# Patient Record
Sex: FEMALE | Race: BLACK OR AFRICAN AMERICAN | NOT HISPANIC OR LATINO
[De-identification: names, ages, dates, MRNs, and addresses within clinical notes are randomized per-mention and may not be internally consistent; named-entity substitution may affect disease eponyms.]

---

## 2020-03-13 ENCOUNTER — APPOINTMENT (OUTPATIENT)
Dept: OBGYN | Facility: CLINIC | Age: 25
End: 2020-03-13
Payer: MEDICAID

## 2020-03-13 ENCOUNTER — ASOB RESULT (OUTPATIENT)
Age: 25
End: 2020-03-13

## 2020-03-13 ENCOUNTER — NON-APPOINTMENT (OUTPATIENT)
Age: 25
End: 2020-03-13

## 2020-03-13 VITALS
WEIGHT: 148 LBS | SYSTOLIC BLOOD PRESSURE: 116 MMHG | DIASTOLIC BLOOD PRESSURE: 60 MMHG | HEIGHT: 56 IN | BODY MASS INDEX: 33.29 KG/M2

## 2020-03-13 DIAGNOSIS — Z78.9 OTHER SPECIFIED HEALTH STATUS: ICD-10-CM

## 2020-03-13 DIAGNOSIS — Z82.49 FAMILY HISTORY OF ISCHEMIC HEART DISEASE AND OTHER DISEASES OF THE CIRCULATORY SYSTEM: ICD-10-CM

## 2020-03-13 PROCEDURE — 0500F INITIAL PRENATAL CARE VISIT: CPT

## 2020-03-13 PROCEDURE — 76805 OB US >/= 14 WKS SNGL FETUS: CPT

## 2020-03-13 PROCEDURE — 36415 COLL VENOUS BLD VENIPUNCTURE: CPT

## 2020-03-20 ENCOUNTER — APPOINTMENT (OUTPATIENT)
Dept: ANTEPARTUM | Facility: CLINIC | Age: 25
End: 2020-03-20
Payer: MEDICAID

## 2020-03-20 ENCOUNTER — ASOB RESULT (OUTPATIENT)
Age: 25
End: 2020-03-20

## 2020-03-20 PROCEDURE — 76817 TRANSVAGINAL US OBSTETRIC: CPT

## 2020-03-20 PROCEDURE — 76816 OB US FOLLOW-UP PER FETUS: CPT

## 2020-03-24 ENCOUNTER — NON-APPOINTMENT (OUTPATIENT)
Age: 25
End: 2020-03-24

## 2020-03-24 LAB
ABO + RH PNL BLD: NORMAL
AR GENE MUT ANL BLD/T: NEGATIVE
B19V IGG SER QL IA: 2.2 INDEX
B19V IGG+IGM SER-IMP: NORMAL
B19V IGG+IGM SER-IMP: POSITIVE
B19V IGM FLD-ACNC: 0.1
B19V IGM SER-ACNC: NEGATIVE
BASOPHILS # BLD AUTO: 0.05 K/UL
BASOPHILS NFR BLD AUTO: 0.5 %
BLD GP AB SCN SERPL QL: NORMAL
C TRACH RRNA SPEC QL NAA+PROBE: NOT DETECTED
CFTR MUT TESTED BLD/T: NEGATIVE
CMV IGG SERPL QL: 0.37 U/ML
CMV IGG SERPL-IMP: NEGATIVE
CMV IGM SERPL QL: <8 AU/ML
CMV IGM SERPL QL: NEGATIVE
CYTOLOGY CVX/VAG DOC THIN PREP: NORMAL
EOSINOPHIL # BLD AUTO: 0.09 K/UL
EOSINOPHIL NFR BLD AUTO: 0.9 %
ESTIMATED AVERAGE GLUCOSE: 97 MG/DL
FMR1 GENE MUT ANL BLD/T: NORMAL
HBA1C MFR BLD HPLC: 5 %
HBV SURFACE AG SER QL: NONREACTIVE
HCT VFR BLD CALC: 31.5 %
HGB A MFR BLD: 96.9 %
HGB A2 MFR BLD: 3.1 %
HGB BLD-MCNC: 10.5 G/DL
HGB FRACT BLD-IMP: NORMAL
HIV1+2 AB SPEC QL IA.RAPID: NONREACTIVE
HPV HIGH+LOW RISK DNA PNL CVX: NOT DETECTED
IMM GRANULOCYTES NFR BLD AUTO: 0.5 %
LYMPHOCYTES # BLD AUTO: 2.16 K/UL
LYMPHOCYTES NFR BLD AUTO: 20.6 %
MAN DIFF?: NORMAL
MCHC RBC-ENTMCNC: 31.2 PG
MCHC RBC-ENTMCNC: 33.3 GM/DL
MCV RBC AUTO: 93.5 FL
MEV IGG FLD QL IA: 35.6 AU/ML
MEV IGG+IGM SER-IMP: POSITIVE
MEV IGM SER QL: NEGATIVE
MONOCYTES # BLD AUTO: 0.61 K/UL
MONOCYTES NFR BLD AUTO: 5.8 %
N GONORRHOEA RRNA SPEC QL NAA+PROBE: NOT DETECTED
NEUTROPHILS # BLD AUTO: 7.53 K/UL
NEUTROPHILS NFR BLD AUTO: 71.7 %
PLATELET # BLD AUTO: 368 K/UL
RBC # BLD: 3.37 M/UL
RBC # FLD: 14 %
RUBV IGG FLD-ACNC: 1.1 INDEX
RUBV IGG SER-IMP: POSITIVE
SOURCE TP AMPLIFICATION: NORMAL
T GONDII AB SER-IMP: NEGATIVE
T GONDII AB SER-IMP: NEGATIVE
T GONDII IGG SER QL: <3 IU/ML
T GONDII IGM SER QL: 4 AU/ML
T PALLIDUM AB SER QL IA: NEGATIVE
TSH SERPL-ACNC: 1.9 UIU/ML
VZV AB TITR SER: POSITIVE
VZV IGG SER IF-ACNC: 344.2 INDEX
WBC # FLD AUTO: 10.49 K/UL

## 2020-03-27 ENCOUNTER — ASOB RESULT (OUTPATIENT)
Age: 25
End: 2020-03-27

## 2020-03-27 ENCOUNTER — NON-APPOINTMENT (OUTPATIENT)
Age: 25
End: 2020-03-27

## 2020-03-27 ENCOUNTER — APPOINTMENT (OUTPATIENT)
Dept: ANTEPARTUM | Facility: CLINIC | Age: 25
End: 2020-03-27
Payer: MEDICAID

## 2020-03-27 ENCOUNTER — APPOINTMENT (OUTPATIENT)
Dept: OBGYN | Facility: CLINIC | Age: 25
End: 2020-03-27
Payer: MEDICAID

## 2020-03-27 VITALS
SYSTOLIC BLOOD PRESSURE: 106 MMHG | BODY MASS INDEX: 32.84 KG/M2 | DIASTOLIC BLOOD PRESSURE: 62 MMHG | WEIGHT: 146 LBS | HEIGHT: 56 IN

## 2020-03-27 VITALS — TEMPERATURE: 98.9 F

## 2020-03-27 LAB
BILIRUB UR QL STRIP: NORMAL
GLUCOSE UR-MCNC: NORMAL
HCG UR QL: 0.2 EU/DL
HGB UR QL STRIP.AUTO: ABNORMAL
KETONES UR-MCNC: NORMAL
LEUKOCYTE ESTERASE UR QL STRIP: ABNORMAL
NITRITE UR QL STRIP: NORMAL
PH UR STRIP: 6
PROT UR STRIP-MCNC: ABNORMAL
SP GR UR STRIP: >=1.03

## 2020-03-27 PROCEDURE — 36415 COLL VENOUS BLD VENIPUNCTURE: CPT

## 2020-03-27 PROCEDURE — 76816 OB US FOLLOW-UP PER FETUS: CPT

## 2020-03-27 PROCEDURE — 0502F SUBSEQUENT PRENATAL CARE: CPT

## 2020-04-09 ENCOUNTER — NON-APPOINTMENT (OUTPATIENT)
Age: 25
End: 2020-04-09

## 2020-04-20 LAB
BASOPHILS # BLD AUTO: 0.02 K/UL
BASOPHILS NFR BLD AUTO: 0.3 %
EOSINOPHIL # BLD AUTO: 0.07 K/UL
EOSINOPHIL NFR BLD AUTO: 1 %
FERRITIN SERPL-MCNC: 17 NG/ML
FOLATE SERPL-MCNC: >20 NG/ML
GLUCOSE 1H P 50 G GLC PO SERPL-MCNC: 123 MG/DL
HCT VFR BLD CALC: 29.2 %
HGB BLD-MCNC: 9.5 G/DL
IMM GRANULOCYTES NFR BLD AUTO: 0.4 %
IRON SATN MFR SERPL: 26 %
IRON SERPL-MCNC: 97 UG/DL
LYMPHOCYTES # BLD AUTO: 1.56 K/UL
LYMPHOCYTES NFR BLD AUTO: 22.7 %
MAN DIFF?: NORMAL
MCHC RBC-ENTMCNC: 30 PG
MCHC RBC-ENTMCNC: 32.5 GM/DL
MCV RBC AUTO: 92.1 FL
MONOCYTES # BLD AUTO: 0.45 K/UL
MONOCYTES NFR BLD AUTO: 6.6 %
NEUTROPHILS # BLD AUTO: 4.73 K/UL
NEUTROPHILS NFR BLD AUTO: 69 %
PLATELET # BLD AUTO: 327 K/UL
RBC # BLD: 3.17 M/UL
RBC # BLD: 3.17 M/UL
RBC # FLD: 13.5 %
RETICS # AUTO: 2.3 %
RETICS AGGREG/RBC NFR: 73.5 K/UL
TIBC SERPL-MCNC: 366 UG/DL
UIBC SERPL-MCNC: 269 UG/DL
VIT B12 SERPL-MCNC: 343 PG/ML
WBC # FLD AUTO: 6.86 K/UL

## 2020-04-24 ENCOUNTER — APPOINTMENT (OUTPATIENT)
Dept: OBGYN | Facility: CLINIC | Age: 25
End: 2020-04-24
Payer: MEDICAID

## 2020-04-24 ENCOUNTER — NON-APPOINTMENT (OUTPATIENT)
Age: 25
End: 2020-04-24

## 2020-04-24 VITALS
SYSTOLIC BLOOD PRESSURE: 80 MMHG | BODY MASS INDEX: 33.61 KG/M2 | DIASTOLIC BLOOD PRESSURE: 58 MMHG | HEIGHT: 56 IN | WEIGHT: 149.4 LBS

## 2020-04-24 LAB
BILIRUB UR QL STRIP: NORMAL
COLLECTION METHOD: NORMAL
GLUCOSE UR-MCNC: NORMAL
HCG UR QL: 0.2 EU/DL
HGB UR QL STRIP.AUTO: NORMAL
KETONES UR-MCNC: NORMAL
LEUKOCYTE ESTERASE UR QL STRIP: NORMAL
NITRITE UR QL STRIP: NORMAL
PH UR STRIP: 6.5
PROT UR STRIP-MCNC: NORMAL
SP GR UR STRIP: 1.01

## 2020-04-24 PROCEDURE — 36415 COLL VENOUS BLD VENIPUNCTURE: CPT

## 2020-04-24 PROCEDURE — 0502F SUBSEQUENT PRENATAL CARE: CPT

## 2020-04-28 LAB
M TB IFN-G BLD-IMP: NEGATIVE
QUANTIFERON TB PLUS MITOGEN MINUS NIL: 6.13 IU/ML
QUANTIFERON TB PLUS NIL: 0.01 IU/ML
QUANTIFERON TB PLUS TB1 MINUS NIL: 0 IU/ML
QUANTIFERON TB PLUS TB2 MINUS NIL: 0 IU/ML

## 2020-05-08 ENCOUNTER — TRANSCRIPTION ENCOUNTER (OUTPATIENT)
Age: 25
End: 2020-05-08

## 2020-05-18 ENCOUNTER — APPOINTMENT (OUTPATIENT)
Dept: ANTEPARTUM | Facility: CLINIC | Age: 25
End: 2020-05-18

## 2020-05-19 ENCOUNTER — APPOINTMENT (OUTPATIENT)
Dept: ANTEPARTUM | Facility: CLINIC | Age: 25
End: 2020-05-19

## 2020-05-20 ENCOUNTER — APPOINTMENT (OUTPATIENT)
Dept: OBGYN | Facility: CLINIC | Age: 25
End: 2020-05-20
Payer: MEDICAID

## 2020-05-20 ENCOUNTER — NON-APPOINTMENT (OUTPATIENT)
Age: 25
End: 2020-05-20

## 2020-05-20 VITALS — HEIGHT: 56 IN | BODY MASS INDEX: 33.29 KG/M2 | WEIGHT: 148 LBS

## 2020-05-20 LAB
BILIRUB UR QL STRIP: NORMAL
GLUCOSE UR-MCNC: NORMAL
HCG UR QL: 0.2 EU/DL
HGB UR QL STRIP.AUTO: ABNORMAL
KETONES UR-MCNC: NORMAL
LEUKOCYTE ESTERASE UR QL STRIP: ABNORMAL
NITRITE UR QL STRIP: NORMAL
PH UR STRIP: 5.5
PROT UR STRIP-MCNC: NORMAL
SP GR UR STRIP: 1.01

## 2020-05-20 PROCEDURE — 90471 IMMUNIZATION ADMIN: CPT

## 2020-05-20 PROCEDURE — 90715 TDAP VACCINE 7 YRS/> IM: CPT

## 2020-05-20 PROCEDURE — 0502F SUBSEQUENT PRENATAL CARE: CPT

## 2020-06-10 ENCOUNTER — NON-APPOINTMENT (OUTPATIENT)
Age: 25
End: 2020-06-10

## 2020-06-10 ENCOUNTER — APPOINTMENT (OUTPATIENT)
Dept: OBGYN | Facility: CLINIC | Age: 25
End: 2020-06-10
Payer: MEDICAID

## 2020-06-10 VITALS
BODY MASS INDEX: 34.42 KG/M2 | WEIGHT: 153 LBS | SYSTOLIC BLOOD PRESSURE: 112 MMHG | DIASTOLIC BLOOD PRESSURE: 66 MMHG | HEIGHT: 56 IN

## 2020-06-10 PROCEDURE — 36415 COLL VENOUS BLD VENIPUNCTURE: CPT

## 2020-06-10 PROCEDURE — 0502F SUBSEQUENT PRENATAL CARE: CPT

## 2020-06-18 ENCOUNTER — APPOINTMENT (OUTPATIENT)
Dept: OBGYN | Facility: CLINIC | Age: 25
End: 2020-06-18
Payer: MEDICAID

## 2020-06-18 ENCOUNTER — NON-APPOINTMENT (OUTPATIENT)
Age: 25
End: 2020-06-18

## 2020-06-18 VITALS
SYSTOLIC BLOOD PRESSURE: 108 MMHG | DIASTOLIC BLOOD PRESSURE: 62 MMHG | WEIGHT: 154 LBS | BODY MASS INDEX: 34.64 KG/M2 | HEIGHT: 56 IN

## 2020-06-18 PROCEDURE — 0502F SUBSEQUENT PRENATAL CARE: CPT

## 2020-06-19 ENCOUNTER — APPOINTMENT (OUTPATIENT)
Dept: OBGYN | Facility: CLINIC | Age: 25
End: 2020-06-19

## 2020-06-22 LAB
B-HEM STREP SPEC QL CULT: NORMAL
BILIRUB UR QL STRIP: NORMAL
COLLECTION METHOD: NORMAL
GLUCOSE UR-MCNC: NORMAL
HCG UR QL: 0.2 EU/DL
HGB UR QL STRIP.AUTO: NORMAL
HIV1+2 AB SPEC QL IA.RAPID: NONREACTIVE
KETONES UR-MCNC: NORMAL
LEUKOCYTE ESTERASE UR QL STRIP: ABNORMAL
NITRITE UR QL STRIP: NORMAL
PH UR STRIP: 7
PROT UR STRIP-MCNC: NORMAL
SP GR UR STRIP: 1.01

## 2020-06-25 ENCOUNTER — NON-APPOINTMENT (OUTPATIENT)
Age: 25
End: 2020-06-25

## 2020-06-25 ENCOUNTER — APPOINTMENT (OUTPATIENT)
Dept: OBGYN | Facility: CLINIC | Age: 25
End: 2020-06-25
Payer: MEDICAID

## 2020-06-25 VITALS
WEIGHT: 155 LBS | BODY MASS INDEX: 34.87 KG/M2 | DIASTOLIC BLOOD PRESSURE: 70 MMHG | SYSTOLIC BLOOD PRESSURE: 108 MMHG | HEIGHT: 56 IN

## 2020-06-25 LAB
BILIRUB UR QL STRIP: NORMAL
GLUCOSE UR-MCNC: NORMAL
HCG UR QL: 0.2 EU/DL
HGB UR QL STRIP.AUTO: NORMAL
KETONES UR-MCNC: NORMAL
LEUKOCYTE ESTERASE UR QL STRIP: ABNORMAL
NITRITE UR QL STRIP: NORMAL
PH UR STRIP: 7
PROT UR STRIP-MCNC: NORMAL
SP GR UR STRIP: 1.02

## 2020-06-25 PROCEDURE — 0502F SUBSEQUENT PRENATAL CARE: CPT

## 2020-06-28 ENCOUNTER — OUTPATIENT (OUTPATIENT)
Dept: OUTPATIENT SERVICES | Facility: HOSPITAL | Age: 25
LOS: 1 days | End: 2020-06-28
Payer: MEDICAID

## 2020-06-28 ENCOUNTER — OUTPATIENT (OUTPATIENT)
Dept: OUTPATIENT SERVICES | Facility: HOSPITAL | Age: 25
LOS: 1 days | End: 2020-06-28

## 2020-06-28 VITALS
WEIGHT: 154.1 LBS | HEIGHT: 66 IN | HEART RATE: 73 BPM | SYSTOLIC BLOOD PRESSURE: 102 MMHG | DIASTOLIC BLOOD PRESSURE: 56 MMHG | TEMPERATURE: 98 F | RESPIRATION RATE: 17 BRPM

## 2020-06-28 DIAGNOSIS — Z01.818 ENCOUNTER FOR OTHER PREPROCEDURAL EXAMINATION: ICD-10-CM

## 2020-06-28 DIAGNOSIS — Z98.891 HISTORY OF UTERINE SCAR FROM PREVIOUS SURGERY: Chronic | ICD-10-CM

## 2020-06-28 LAB
ALBUMIN SERPL ELPH-MCNC: 3.5 G/DL — SIGNIFICANT CHANGE UP (ref 3.3–5.2)
ALP SERPL-CCNC: 173 U/L — HIGH (ref 40–120)
ALT FLD-CCNC: 19 U/L — SIGNIFICANT CHANGE UP
ANION GAP SERPL CALC-SCNC: 15 MMOL/L — SIGNIFICANT CHANGE UP (ref 5–17)
APPEARANCE UR: CLEAR — SIGNIFICANT CHANGE UP
APTT BLD: 33.8 SEC — SIGNIFICANT CHANGE UP (ref 27.5–36.3)
AST SERPL-CCNC: 22 U/L — SIGNIFICANT CHANGE UP
BACTERIA # UR AUTO: ABNORMAL
BASOPHILS # BLD AUTO: 0.01 K/UL — SIGNIFICANT CHANGE UP (ref 0–0.2)
BASOPHILS NFR BLD AUTO: 0.1 % — SIGNIFICANT CHANGE UP (ref 0–2)
BILIRUB SERPL-MCNC: 0.3 MG/DL — LOW (ref 0.4–2)
BILIRUB UR-MCNC: NEGATIVE — SIGNIFICANT CHANGE UP
BLD GP AB SCN SERPL QL: SIGNIFICANT CHANGE UP
BUN SERPL-MCNC: 4 MG/DL — LOW (ref 8–20)
CALCIUM SERPL-MCNC: 8.7 MG/DL — SIGNIFICANT CHANGE UP (ref 8.6–10.2)
CHLORIDE SERPL-SCNC: 103 MMOL/L — SIGNIFICANT CHANGE UP (ref 98–107)
CO2 SERPL-SCNC: 18 MMOL/L — LOW (ref 22–29)
COLOR SPEC: YELLOW — SIGNIFICANT CHANGE UP
CREAT SERPL-MCNC: 0.39 MG/DL — LOW (ref 0.5–1.3)
DIFF PNL FLD: ABNORMAL
EOSINOPHIL # BLD AUTO: 0.05 K/UL — SIGNIFICANT CHANGE UP (ref 0–0.5)
EOSINOPHIL NFR BLD AUTO: 0.7 % — SIGNIFICANT CHANGE UP (ref 0–6)
EPI CELLS # UR: SIGNIFICANT CHANGE UP
GLUCOSE SERPL-MCNC: 81 MG/DL — SIGNIFICANT CHANGE UP (ref 70–99)
GLUCOSE UR QL: NEGATIVE MG/DL — SIGNIFICANT CHANGE UP
HCT VFR BLD CALC: 36.5 % — SIGNIFICANT CHANGE UP (ref 34.5–45)
HGB BLD-MCNC: 12.5 G/DL — SIGNIFICANT CHANGE UP (ref 11.5–15.5)
IMM GRANULOCYTES NFR BLD AUTO: 0.6 % — SIGNIFICANT CHANGE UP (ref 0–1.5)
INR BLD: 1.03 RATIO — SIGNIFICANT CHANGE UP (ref 0.88–1.16)
KETONES UR-MCNC: NEGATIVE — SIGNIFICANT CHANGE UP
LEUKOCYTE ESTERASE UR-ACNC: ABNORMAL
LYMPHOCYTES # BLD AUTO: 1.61 K/UL — SIGNIFICANT CHANGE UP (ref 1–3.3)
LYMPHOCYTES # BLD AUTO: 22.8 % — SIGNIFICANT CHANGE UP (ref 13–44)
MCHC RBC-ENTMCNC: 30.6 PG — SIGNIFICANT CHANGE UP (ref 27–34)
MCHC RBC-ENTMCNC: 34.2 GM/DL — SIGNIFICANT CHANGE UP (ref 32–36)
MCV RBC AUTO: 89.2 FL — SIGNIFICANT CHANGE UP (ref 80–100)
MONOCYTES # BLD AUTO: 0.47 K/UL — SIGNIFICANT CHANGE UP (ref 0–0.9)
MONOCYTES NFR BLD AUTO: 6.6 % — SIGNIFICANT CHANGE UP (ref 2–14)
NEUTROPHILS # BLD AUTO: 4.89 K/UL — SIGNIFICANT CHANGE UP (ref 1.8–7.4)
NEUTROPHILS NFR BLD AUTO: 69.2 % — SIGNIFICANT CHANGE UP (ref 43–77)
NITRITE UR-MCNC: NEGATIVE — SIGNIFICANT CHANGE UP
PH UR: 6.5 — SIGNIFICANT CHANGE UP (ref 5–8)
PLATELET # BLD AUTO: 276 K/UL — SIGNIFICANT CHANGE UP (ref 150–400)
POTASSIUM SERPL-MCNC: 3.3 MMOL/L — LOW (ref 3.5–5.3)
POTASSIUM SERPL-SCNC: 3.3 MMOL/L — LOW (ref 3.5–5.3)
PROT SERPL-MCNC: 6.6 G/DL — SIGNIFICANT CHANGE UP (ref 6.6–8.7)
PROT UR-MCNC: 15 MG/DL
PROTHROM AB SERPL-ACNC: 11.7 SEC — SIGNIFICANT CHANGE UP (ref 10–12.9)
RBC # BLD: 4.09 M/UL — SIGNIFICANT CHANGE UP (ref 3.8–5.2)
RBC # FLD: 14.6 % — HIGH (ref 10.3–14.5)
RBC CASTS # UR COMP ASSIST: SIGNIFICANT CHANGE UP /HPF (ref 0–4)
SODIUM SERPL-SCNC: 135 MMOL/L — SIGNIFICANT CHANGE UP (ref 135–145)
SP GR SPEC: 1.01 — SIGNIFICANT CHANGE UP (ref 1.01–1.02)
UROBILINOGEN FLD QL: NEGATIVE MG/DL — SIGNIFICANT CHANGE UP
WBC # BLD: 7.07 K/UL — SIGNIFICANT CHANGE UP (ref 3.8–10.5)
WBC # FLD AUTO: 7.07 K/UL — SIGNIFICANT CHANGE UP (ref 3.8–10.5)
WBC UR QL: SIGNIFICANT CHANGE UP

## 2020-06-29 ENCOUNTER — TRANSCRIPTION ENCOUNTER (OUTPATIENT)
Age: 25
End: 2020-06-29

## 2020-06-29 LAB — SARS-COV-2 RNA SPEC QL NAA+PROBE: SIGNIFICANT CHANGE UP

## 2020-06-29 RX ORDER — OXYTOCIN 10 UNIT/ML
333.33 VIAL (ML) INJECTION
Qty: 20 | Refills: 0 | Status: DISCONTINUED | OUTPATIENT
Start: 2020-06-30 | End: 2020-07-01

## 2020-06-29 RX ORDER — METOCLOPRAMIDE HCL 10 MG
10 TABLET ORAL ONCE
Refills: 0 | Status: DISCONTINUED | OUTPATIENT
Start: 2020-06-30 | End: 2020-06-30

## 2020-06-29 RX ORDER — SODIUM CHLORIDE 9 MG/ML
1000 INJECTION, SOLUTION INTRAVENOUS
Refills: 0 | Status: DISCONTINUED | OUTPATIENT
Start: 2020-06-30 | End: 2020-06-30

## 2020-06-29 NOTE — OB PROVIDER H&P - NSHPPHYSICALEXAM_GEN_ALL_CORE
Vital Signs Last 24 Hrs  T(C): 36.7 (30 Jun 2020 09:59), Max: 36.7 (30 Jun 2020 09:59)  T(F): 98.1 (30 Jun 2020 09:59), Max: 98.1 (30 Jun 2020 09:59)  HR: 86 (30 Jun 2020 09:59) (86 - 86)  BP: 118/71 (30 Jun 2020 09:59) (118/71 - 118/71)  RR: 18 (30 Jun 2020 09:59) (18 - 18)    FHT: Cat 1  [place]: q3-4m, painless

## 2020-06-29 NOTE — OB PROVIDER H&P - ASSESSMENT
25 year old  at 39w3d admitted for repeat . Cat 1 tracing. VSS.    Plan:  Admit to OBGYN  Routine labs  COVID Neg  NST  Ancef 2g  PPH Risk: 2x prbc on hold    d/w Dr. Gandara

## 2020-06-29 NOTE — OB PROVIDER H&P - HISTORY OF PRESENT ILLNESS
Sherine Morin   Patient is a 25 year old  at 39w3d who presents to L&D for repeat  delivery   [place]ALE[place]: 2020   LMP: 2019   Pregnancy course: complicated by late to care at 23w, anemia during pregnancy,   Obhx:   G1: 2014 38w c/s at [facility]   G2: 10/2016 39w c/s at [facility]   Pmhx: none   Pshx: c/s x2,   Meds: pnv, iron   Allergies: nkda   BMI: 33.2   No recent sono Sherine Morin   Patient is a 25 year old  at 39w3d who presents to L&D for repeat  delivery   [place]ALE[place]: 2020   LMP: 2019   Pregnancy course: complicated by late to care at 23w, anemia during pregnancy,   Obhx:   G1: 2014 38w c/s at [facility]   G2: 10/2016 39w c/s at [facility]   Pmhx: 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

## 2020-06-30 ENCOUNTER — APPOINTMENT (OUTPATIENT)
Dept: OBGYN | Facility: HOSPITAL | Age: 25
End: 2020-06-30

## 2020-06-30 ENCOUNTER — RESULT REVIEW (OUTPATIENT)
Age: 25
End: 2020-06-30

## 2020-06-30 ENCOUNTER — TRANSCRIPTION ENCOUNTER (OUTPATIENT)
Age: 25
End: 2020-06-30

## 2020-06-30 ENCOUNTER — INPATIENT (INPATIENT)
Facility: HOSPITAL | Age: 25
LOS: 0 days | Discharge: HOME | End: 2020-07-01
Attending: SPECIALIST | Admitting: SPECIALIST
Payer: MEDICAID

## 2020-06-30 VITALS — SYSTOLIC BLOOD PRESSURE: 118 MMHG | HEART RATE: 86 BPM | DIASTOLIC BLOOD PRESSURE: 71 MMHG

## 2020-06-30 DIAGNOSIS — O34.219 MATERNAL CARE FOR UNSPECIFIED TYPE SCAR FROM PREVIOUS CESAREAN DELIVERY: ICD-10-CM

## 2020-06-30 DIAGNOSIS — Z98.891 HISTORY OF UTERINE SCAR FROM PREVIOUS SURGERY: Chronic | ICD-10-CM

## 2020-06-30 LAB
BLD GP AB SCN SERPL QL: SIGNIFICANT CHANGE UP
T PALLIDUM AB TITR SER: NEGATIVE — SIGNIFICANT CHANGE UP

## 2020-06-30 PROCEDURE — 88304 TISSUE EXAM BY PATHOLOGIST: CPT | Mod: 26

## 2020-06-30 PROCEDURE — 86900 BLOOD TYPING SEROLOGIC ABO: CPT

## 2020-06-30 PROCEDURE — 85027 COMPLETE CBC AUTOMATED: CPT

## 2020-06-30 PROCEDURE — G0463: CPT

## 2020-06-30 PROCEDURE — 86850 RBC ANTIBODY SCREEN: CPT

## 2020-06-30 PROCEDURE — 59510 CESAREAN DELIVERY: CPT | Mod: U9

## 2020-06-30 PROCEDURE — 36415 COLL VENOUS BLD VENIPUNCTURE: CPT

## 2020-06-30 PROCEDURE — 85730 THROMBOPLASTIN TIME PARTIAL: CPT

## 2020-06-30 PROCEDURE — 81001 URINALYSIS AUTO W/SCOPE: CPT

## 2020-06-30 PROCEDURE — 13101 CMPLX RPR TRUNK 2.6-7.5 CM: CPT | Mod: 59

## 2020-06-30 PROCEDURE — 86901 BLOOD TYPING SEROLOGIC RH(D): CPT

## 2020-06-30 PROCEDURE — 80053 COMPREHEN METABOLIC PANEL: CPT

## 2020-06-30 PROCEDURE — U0003: CPT

## 2020-06-30 PROCEDURE — 86923 COMPATIBILITY TEST ELECTRIC: CPT

## 2020-06-30 PROCEDURE — 85610 PROTHROMBIN TIME: CPT

## 2020-06-30 RX ORDER — DIPHENHYDRAMINE HCL 50 MG
25 CAPSULE ORAL EVERY 6 HOURS
Refills: 0 | Status: DISCONTINUED | OUTPATIENT
Start: 2020-06-30 | End: 2020-07-01

## 2020-06-30 RX ORDER — OXYCODONE HYDROCHLORIDE 5 MG/1
5 TABLET ORAL
Refills: 0 | Status: DISCONTINUED | OUTPATIENT
Start: 2020-06-30 | End: 2020-07-01

## 2020-06-30 RX ORDER — CEFAZOLIN SODIUM 1 G
2000 VIAL (EA) INJECTION ONCE
Refills: 0 | Status: COMPLETED | OUTPATIENT
Start: 2020-06-30 | End: 2020-06-30

## 2020-06-30 RX ORDER — SODIUM CHLORIDE 9 MG/ML
1000 INJECTION, SOLUTION INTRAVENOUS ONCE
Refills: 0 | Status: COMPLETED | OUTPATIENT
Start: 2020-06-30 | End: 2020-06-30

## 2020-06-30 RX ORDER — SIMETHICONE 80 MG/1
80 TABLET, CHEWABLE ORAL EVERY 4 HOURS
Refills: 0 | Status: DISCONTINUED | OUTPATIENT
Start: 2020-06-30 | End: 2020-07-01

## 2020-06-30 RX ORDER — NALOXONE HYDROCHLORIDE 4 MG/.1ML
0.1 SPRAY NASAL
Refills: 0 | Status: DISCONTINUED | OUTPATIENT
Start: 2020-06-30 | End: 2020-07-01

## 2020-06-30 RX ORDER — MORPHINE SULFATE 50 MG/1
0.2 CAPSULE, EXTENDED RELEASE ORAL ONCE
Refills: 0 | Status: DISCONTINUED | OUTPATIENT
Start: 2020-06-30 | End: 2020-07-01

## 2020-06-30 RX ORDER — SODIUM CHLORIDE 9 MG/ML
500 INJECTION, SOLUTION INTRAVENOUS ONCE
Refills: 0 | Status: COMPLETED | OUTPATIENT
Start: 2020-06-30 | End: 2020-06-30

## 2020-06-30 RX ORDER — ENOXAPARIN SODIUM 100 MG/ML
40 INJECTION SUBCUTANEOUS EVERY 24 HOURS
Refills: 0 | Status: DISCONTINUED | OUTPATIENT
Start: 2020-06-30 | End: 2020-07-01

## 2020-06-30 RX ORDER — OXYTOCIN 10 UNIT/ML
333.33 VIAL (ML) INJECTION
Qty: 20 | Refills: 0 | Status: DISCONTINUED | OUTPATIENT
Start: 2020-06-30 | End: 2020-07-01

## 2020-06-30 RX ORDER — ACETAMINOPHEN 500 MG
975 TABLET ORAL
Refills: 0 | Status: DISCONTINUED | OUTPATIENT
Start: 2020-06-30 | End: 2020-07-01

## 2020-06-30 RX ORDER — IBUPROFEN 200 MG
600 TABLET ORAL EVERY 6 HOURS
Refills: 0 | Status: DISCONTINUED | OUTPATIENT
Start: 2020-06-30 | End: 2020-07-01

## 2020-06-30 RX ORDER — LANOLIN
1 OINTMENT (GRAM) TOPICAL EVERY 6 HOURS
Refills: 0 | Status: DISCONTINUED | OUTPATIENT
Start: 2020-06-30 | End: 2020-07-01

## 2020-06-30 RX ORDER — OXYCODONE HYDROCHLORIDE 5 MG/1
5 TABLET ORAL ONCE
Refills: 0 | Status: DISCONTINUED | OUTPATIENT
Start: 2020-06-30 | End: 2020-07-01

## 2020-06-30 RX ORDER — DIPHENHYDRAMINE HCL 50 MG
12.5 CAPSULE ORAL EVERY 4 HOURS
Refills: 0 | Status: DISCONTINUED | OUTPATIENT
Start: 2020-06-30 | End: 2020-07-01

## 2020-06-30 RX ORDER — FAMOTIDINE 10 MG/ML
20 INJECTION INTRAVENOUS ONCE
Refills: 0 | Status: COMPLETED | OUTPATIENT
Start: 2020-06-30 | End: 2020-06-30

## 2020-06-30 RX ORDER — TETANUS TOXOID, REDUCED DIPHTHERIA TOXOID AND ACELLULAR PERTUSSIS VACCINE, ADSORBED 5; 2.5; 8; 8; 2.5 [IU]/.5ML; [IU]/.5ML; UG/.5ML; UG/.5ML; UG/.5ML
0.5 SUSPENSION INTRAMUSCULAR ONCE
Refills: 0 | Status: DISCONTINUED | OUTPATIENT
Start: 2020-06-30 | End: 2020-07-01

## 2020-06-30 RX ORDER — KETOROLAC TROMETHAMINE 30 MG/ML
30 SYRINGE (ML) INJECTION EVERY 6 HOURS
Refills: 0 | Status: DISCONTINUED | OUTPATIENT
Start: 2020-06-30 | End: 2020-07-01

## 2020-06-30 RX ORDER — SODIUM CHLORIDE 9 MG/ML
1000 INJECTION, SOLUTION INTRAVENOUS
Refills: 0 | Status: DISCONTINUED | OUTPATIENT
Start: 2020-06-30 | End: 2020-07-01

## 2020-06-30 RX ORDER — MAGNESIUM HYDROXIDE 400 MG/1
30 TABLET, CHEWABLE ORAL
Refills: 0 | Status: DISCONTINUED | OUTPATIENT
Start: 2020-06-30 | End: 2020-07-01

## 2020-06-30 RX ORDER — ONDANSETRON 8 MG/1
4 TABLET, FILM COATED ORAL ONCE
Refills: 0 | Status: DISCONTINUED | OUTPATIENT
Start: 2020-06-30 | End: 2020-07-01

## 2020-06-30 RX ORDER — CITRIC ACID/SODIUM CITRATE 300-500 MG
30 SOLUTION, ORAL ORAL ONCE
Refills: 0 | Status: COMPLETED | OUTPATIENT
Start: 2020-06-30 | End: 2020-06-30

## 2020-06-30 RX ORDER — KETOROLAC TROMETHAMINE 30 MG/ML
30 SYRINGE (ML) INJECTION ONCE
Refills: 0 | Status: DISCONTINUED | OUTPATIENT
Start: 2020-06-30 | End: 2020-06-30

## 2020-06-30 RX ADMIN — Medication 30 MILLILITER(S): at 10:40

## 2020-06-30 RX ADMIN — Medication 1000 MILLIUNIT(S)/MIN: at 12:01

## 2020-06-30 RX ADMIN — Medication 975 MILLIGRAM(S): at 20:53

## 2020-06-30 RX ADMIN — Medication 100 MILLIGRAM(S): at 11:21

## 2020-06-30 RX ADMIN — SODIUM CHLORIDE 1000 MILLILITER(S): 9 INJECTION, SOLUTION INTRAVENOUS at 17:37

## 2020-06-30 RX ADMIN — SODIUM CHLORIDE 125 MILLILITER(S): 9 INJECTION, SOLUTION INTRAVENOUS at 10:50

## 2020-06-30 RX ADMIN — SODIUM CHLORIDE 2000 MILLILITER(S): 9 INJECTION, SOLUTION INTRAVENOUS at 09:50

## 2020-06-30 RX ADMIN — Medication 30 MILLIGRAM(S): at 23:59

## 2020-06-30 RX ADMIN — Medication 30 MILLIGRAM(S): at 13:59

## 2020-06-30 RX ADMIN — ENOXAPARIN SODIUM 40 MILLIGRAM(S): 100 INJECTION SUBCUTANEOUS at 20:16

## 2020-06-30 RX ADMIN — FAMOTIDINE 20 MILLIGRAM(S): 10 INJECTION INTRAVENOUS at 10:41

## 2020-06-30 NOTE — DISCHARGE NOTE OB - CARE PROVIDERS DIRECT ADDRESSES
[email or web address]

## 2020-06-30 NOTE — OB RN DELIVERY SUMMARY - NS_RNDELIVATTEST_OBGYN_ALL_OB
OB Provider reported that the vagina was inspected and no foreign body was found/Laps, needles and instrument count was correct Laps, needles and instrument count was correct

## 2020-06-30 NOTE — DISCHARGE NOTE OB - PLAN OF CARE
rapid recovery Please call your provider in 1-2 weeks for wound check. Take medications as directed, regular diet, activity as tolerated. Exclusive breast feeding for the first 6 months is recommended. Nothing per vagina for 6 weeks (incl. sex, douching, etc). If you have additional concerns, please inform your provider. 1) Please take ibuprofen and Tylenol as needed for pain.  2) No heavy lifting or strenuous activity. Nothing per vagina for 6 weeks (including no sex, no tampons, and no douching).  3) Please call [facility] office for a follow up your postpartum appointment in 1-2 weeks at [phone].  4) Please continue taking vitamins postpartum.   5) Please call the office sooner if you have heavy vaginal bleeding, severe abdominal pain, or fever.

## 2020-06-30 NOTE — OB RN DELIVERY SUMMARY - NS_SEPSISRSKCALC_OBGYN_ALL_OB_FT
EOS calculated successfully. EOS Risk Factor: 0.5/1000 live births ([facility] national incidence); GA=39w3d; Temp=98.1; ROM=0.017; GBS='Negative'; Antibiotics='No antibiotics or any antibiotics < 2 hrs prior to birth'

## 2020-06-30 NOTE — DISCHARGE NOTE OB - PATIENT PORTAL LINK FT
You can access the FollowMyHealth Patient Portal offered by [facility] by registering at the following website: [email or web address]. By joining [facility]’s FollowMyHealth portal, you will also be able to view your health information using other applications (apps) compatible with our system.

## 2020-06-30 NOTE — OB PROVIDER DELIVERY SUMMARY - NSPROVIDERDELIVERYNOTE_OBGYN_ALL_OB_FT
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  Findings: bladder densely adhered to anterior uterine wall, thin lower uterine segment. Female infant, cephalic presentation, APGARs 9/9, weight 6lb7oz. Otherwise grossly normal uterus, fallopian tubes and ovaries bilaterally

## 2020-06-30 NOTE — DISCHARGE NOTE OB - CARE PLAN
Principal Discharge DX:	H/O  section  Goal:	rapid recovery  Assessment and plan of treatment:	Please call your provider in 1-2 weeks for wound check. Take medications as directed, regular diet, activity as tolerated. Exclusive breast feeding for the first 6 months is recommended. Nothing per vagina for 6 weeks (incl. sex, douching, etc). If you have additional concerns, please inform your provider. Principal Discharge DX:	H/O  section  Goal:	rapid recovery  Assessment and plan of treatment:	1) Please take ibuprofen and Tylenol as needed for pain.  2) No heavy lifting or strenuous activity. Nothing per vagina for 6 weeks (including no sex, no tampons, and no douching).  3) Please call [facility] office for a follow up your postpartum appointment in 1-2 weeks at [phone].  4) Please continue taking vitamins postpartum.   5) Please call the office sooner if you have heavy vaginal bleeding, severe abdominal pain, or fever.

## 2020-06-30 NOTE — DISCHARGE NOTE OB - MEDICATION SUMMARY - MEDICATIONS TO TAKE
I will START or STAY ON the medications listed below when I get home from the hospital:    acetaminophen 325 mg oral tablet  -- 3 tab(s) by mouth   -- Indication: For pain    ibuprofen 600 mg oral tablet  -- 1 tab(s) by mouth every 6 hours  -- Indication: For pain I will START or STAY ON the medications listed below when I get home from the hospital:    ibuprofen 600 mg oral tablet  -- 1 tab(s) by mouth every 6 hours, As Needed for mild to moderate pain  -- Do not take this drug if you are pregnant.  It is very important that you take or use this exactly as directed.  Do not skip doses or discontinue unless directed by your doctor.  May cause drowsiness or dizziness.  Obtain medical advice before taking any non-prescription drugs as some may affect the action of this medication.  Take with food or milk.    -- Indication: For H/O  section    Tylenol 325 mg oral tablet  -- 2 tab(s) by mouth every 6 hours, As Needed for mild pain  -- This product contains acetaminophen.  Do not use  with any other product containing acetaminophen to prevent possible liver damage.    -- Indication: For H/O  section    oxyCODONE 5 mg oral tablet  -- 1 tab(s) by mouth every 6 hours, As Needed for severe pain MDD:4 tabs  -- Caution federal law prohibits the transfer of this drug to any person other  than the person for whom it was prescribed.  It is very important that you take or use this exactly as directed.  Do not skip doses or discontinue unless directed by your doctor.  May cause drowsiness.  Alcohol may intensify this effect.  Use care when operating dangerous machinery.  This prescription cannot be refilled.  Using more of this medication than prescribed may cause serious breathing problems.    -- Indication: For H/O  section I will START or STAY ON the medications listed below when I get home from the hospital:    ibuprofen 600 mg oral tablet  -- 1 tab(s) by mouth every 6 hours, As Needed for mild to moderate pain  -- Do not take this drug if you are pregnant.  It is very important that you take or use this exactly as directed.  Do not skip doses or discontinue unless directed by your doctor.  May cause drowsiness or dizziness.  Obtain medical advice before taking any non-prescription drugs as some may affect the action of this medication.  Take with food or milk.    -- Indication: For H/O  section    Tylenol 325 mg oral tablet  -- 2 tab(s) by mouth every 6 hours, As Needed for mild pain  -- This product contains acetaminophen.  Do not use  with any other product containing acetaminophen to prevent possible liver damage.    -- Indication: For H/O  section    oxycodone-acetaminophen 5 mg-325 mg oral tablet  -- 1 tab(s) by mouth every 6 hours, As Needed -for severe pain MDD:4   -- Caution federal law prohibits the transfer of this drug to any person other  than the person for whom it was prescribed.  May cause drowsiness.  Alcohol may intensify this effect.  Use care when operating dangerous machinery.  This prescription cannot be refilled.  This product contains acetaminophen.  Do not use  with any other product containing acetaminophen to prevent possible liver damage.  Using more of this medication than prescribed may cause serious breathing problems.    -- Indication: For H/O  section

## 2020-06-30 NOTE — DISCHARGE NOTE OB - CARE PROVIDER_API CALL
Elisha Gandara  OBSTETRICS AND GYNECOLOGY  [address] [place] [address]  Phone: [phone]  Fax: [phone]  Follow Up Time:

## 2020-06-30 NOTE — OB NEONATOLOGY/PEDIATRICIAN DELIVERY SUMMARY - NSPEDSNEONOTESA_OBGYN_ALL_OB_FT
Requested by Dr Gandara to attend a  R C/S of a 26 y/o  mom at 39.2 weeks GA.  She had + PNC, is O positive, HBsAg neg, HIV neg, RPR NR, Rubella Imm, GBS neg, hx of  Anemia, Iron infusion. COVID19 neg mom and dad.  L&D:  Admitted L&D for C/S.   Baby born vertex with good cry,  delayed cord clamping of 15 sec, transferred to warmer, orally suctioned, dried, stimulated, and examined.  Infant showed to Father them to mother for STS.  Ass: Ft female AGA, BW g  Plan: transfer to RN for transition under Hospitalist. Requested by Dr Gandara to attend a  R C/S of a 26 y/o  mom at 39.2 weeks GA.  She had + PNC, is O positive, HBsAg neg, HIV neg, RPR NR, Rubella Imm, GBS neg, hx of  Anemia, Iron infusion. COVID19 neg mom and dad.  L&D:  Admitted L&D for C/S.   Baby born vertex with good cry,  delayed cord clamping , transferred to warmer, orally suctioned, dried, stimulated, and examined.  Infant showed to Father them to mother for STS.  Ass: Ft female AGA, BW 2930g  Plan: transfer to RN for transition under Hospitalist.

## 2020-07-01 VITALS
SYSTOLIC BLOOD PRESSURE: 90 MMHG | HEART RATE: 66 BPM | RESPIRATION RATE: 18 BRPM | TEMPERATURE: 98 F | DIASTOLIC BLOOD PRESSURE: 53 MMHG

## 2020-07-01 LAB
BASOPHILS # BLD AUTO: 0.03 K/UL — SIGNIFICANT CHANGE UP (ref 0–0.2)
BASOPHILS NFR BLD AUTO: 0.3 % — SIGNIFICANT CHANGE UP (ref 0–2)
EOSINOPHIL # BLD AUTO: 0.05 K/UL — SIGNIFICANT CHANGE UP (ref 0–0.5)
EOSINOPHIL NFR BLD AUTO: 0.4 % — SIGNIFICANT CHANGE UP (ref 0–6)
HCT VFR BLD CALC: 32.3 % — LOW (ref 34.5–45)
HGB BLD-MCNC: 10.9 G/DL — LOW (ref 11.5–15.5)
IMM GRANULOCYTES NFR BLD AUTO: 0.4 % — SIGNIFICANT CHANGE UP (ref 0–1.5)
LYMPHOCYTES # BLD AUTO: 1.53 K/UL — SIGNIFICANT CHANGE UP (ref 1–3.3)
LYMPHOCYTES # BLD AUTO: 13.6 % — SIGNIFICANT CHANGE UP (ref 13–44)
MCHC RBC-ENTMCNC: 30.9 PG — SIGNIFICANT CHANGE UP (ref 27–34)
MCHC RBC-ENTMCNC: 33.7 GM/DL — SIGNIFICANT CHANGE UP (ref 32–36)
MCV RBC AUTO: 91.5 FL — SIGNIFICANT CHANGE UP (ref 80–100)
MONOCYTES # BLD AUTO: 0.73 K/UL — SIGNIFICANT CHANGE UP (ref 0–0.9)
MONOCYTES NFR BLD AUTO: 6.5 % — SIGNIFICANT CHANGE UP (ref 2–14)
NEUTROPHILS # BLD AUTO: 8.91 K/UL — HIGH (ref 1.8–7.4)
NEUTROPHILS NFR BLD AUTO: 78.8 % — HIGH (ref 43–77)
PLATELET # BLD AUTO: 235 K/UL — SIGNIFICANT CHANGE UP (ref 150–400)
RBC # BLD: 3.53 M/UL — LOW (ref 3.8–5.2)
RBC # FLD: 14.5 % — SIGNIFICANT CHANGE UP (ref 10.3–14.5)
WBC # BLD: 11.29 K/UL — HIGH (ref 3.8–10.5)
WBC # FLD AUTO: 11.29 K/UL — HIGH (ref 3.8–10.5)

## 2020-07-01 PROCEDURE — 86900 BLOOD TYPING SEROLOGIC ABO: CPT

## 2020-07-01 PROCEDURE — 86850 RBC ANTIBODY SCREEN: CPT

## 2020-07-01 PROCEDURE — 59025 FETAL NON-STRESS TEST: CPT

## 2020-07-01 PROCEDURE — G0463: CPT

## 2020-07-01 PROCEDURE — 36415 COLL VENOUS BLD VENIPUNCTURE: CPT

## 2020-07-01 PROCEDURE — 85027 COMPLETE CBC AUTOMATED: CPT

## 2020-07-01 PROCEDURE — 59050 FETAL MONITOR W/REPORT: CPT

## 2020-07-01 PROCEDURE — C1765: CPT

## 2020-07-01 PROCEDURE — 86901 BLOOD TYPING SEROLOGIC RH(D): CPT

## 2020-07-01 PROCEDURE — 86780 TREPONEMA PALLIDUM: CPT

## 2020-07-01 PROCEDURE — 88304 TISSUE EXAM BY PATHOLOGIST: CPT

## 2020-07-01 RX ORDER — IBUPROFEN 200 MG
1 TABLET ORAL
Qty: 56 | Refills: 0
Start: 2020-07-01 | End: 2020-07-14

## 2020-07-01 RX ORDER — OXYCODONE AND ACETAMINOPHEN 5; 325 MG/1; MG/1
1 TABLET ORAL
Qty: 16 | Refills: 0
Start: 2020-07-01 | End: 2020-07-04

## 2020-07-01 RX ORDER — ACETAMINOPHEN 500 MG
3 TABLET ORAL
Qty: 0 | Refills: 0 | DISCHARGE
Start: 2020-07-01

## 2020-07-01 RX ORDER — IBUPROFEN 200 MG
1 TABLET ORAL
Qty: 0 | Refills: 0 | DISCHARGE
Start: 2020-07-01

## 2020-07-01 RX ORDER — OXYCODONE HYDROCHLORIDE 5 MG/1
1 TABLET ORAL
Qty: 12 | Refills: 0
Start: 2020-07-01 | End: 2020-07-03

## 2020-07-01 RX ORDER — ACETAMINOPHEN 500 MG
2 TABLET ORAL
Qty: 168 | Refills: 0
Start: 2020-07-01 | End: 2020-07-21

## 2020-07-01 RX ADMIN — Medication 975 MILLIGRAM(S): at 09:41

## 2020-07-01 RX ADMIN — Medication 975 MILLIGRAM(S): at 15:30

## 2020-07-01 RX ADMIN — Medication 975 MILLIGRAM(S): at 03:02

## 2020-07-01 RX ADMIN — Medication 30 MILLIGRAM(S): at 12:29

## 2020-07-01 RX ADMIN — Medication 30 MILLIGRAM(S): at 05:30

## 2020-07-01 NOTE — CHART NOTE - NSCHARTNOTEFT_GEN_A_CORE
Patient is asking to go home today. She is doing well without any complaints. Her pain is well controlled with pain meds. +Tolerating diet without any nausea or vomiting. +Voiding multiple times without any problems. +Flatus. No bowel movement. +Ambulating. +Mild lochia.    Gen: NAD. A&Ox3.  Abd: Soft, non-distended, appropriately TTP. No rebound tenderness. No guarding. Incision site with steri strips- c/d/i. No erythema or drainage.     She is POD#1 from a repeat C/S. She is doing well and desires to go home today. Baby is cleared for discharge. She is meeting all post-op milestones. She is stable for discharge home. Discharge instructions were given. She has an appointment in the office for her incision check next week.

## 2020-07-01 NOTE — PROGRESS NOTE ADULT - SUBJECTIVE AND OBJECTIVE BOX
S: Patient is doing well with no complaints. +Mild abdominal pain controlled with pain meds. +Tolerating diet without any nausea or vomiting. +Voiding without any problems. She has voided 3-4 times since her noble was removed. No flatus or bowel movement. +Ambulating without any problems. Mild lochia.     O:  Vital Signs Last 24 Hrs  T(C): 36.6 (01 Jul 2020 07:53), Max: 37 (01 Jul 2020 00:14)  T(F): 97.9 (01 Jul 2020 07:53), Max: 98.6 (01 Jul 2020 00:14)  HR: 66 (01 Jul 2020 07:53) (57 - 86)  BP: 90/53 (01 Jul 2020 07:53) (90/53 - 124/84)  BP(mean): --  RR: 18 (01 Jul 2020 07:53) (13 - 22)  SpO2: 99% (01 Jul 2020 04:25) (95% - 100%)  Gen: NAD. A&Ox3.  Lungs: No respiratory distress.  Abd: Soft, non-distended, appropriately tender. No rebound tenderness. No guarding. Pfannenstiel incision with steri strips- clean/dry/intact with no erythema, drainage or bleeding.   Ext: No calf tenderness.    Labs: CBC:                        10.9   11.29 )-----------( 235      ( 01 Jul 2020 06:47 )             32.3

## 2020-07-01 NOTE — PROGRESS NOTE ADULT - ASSESSMENT
JOSE LUIS PENN is a 25y  s/p uncomplicated rCS POD #1 due to previous c/s deliveries. Dressing removed and new steristrips placed. Labs pending, vitals reviewed and wnl.    Plan:  Continue with routine post op care  Encouraged to use SCD's while in bed  Plan for discharge tomorrow pending attending approval 25y  s/p uncomplicated rCS POD #1 due to previous  x2.   Dressing removed and new steristrips placed overnight. Labs pending, vitals reviewed and wnl.    Plan:  Continue with routine post op care  Encouraged to use SCD's while in bed, Lovenox for VTE prophylaxis  Plan for discharge tomorrow pending attending approval  Male infants - desires circumcision 25y  s/p uncomplicated rCS POD #1 due to previous  x2.   Dressing removed and new steristrips placed. Labs pending, vitals reviewed and wnl.    Plan:  1. Routine post-partum care.  2. Continue Lovenox. Encourage ambulation - if pt is not ambulating please use SCDs for DVT ppx.  3. Regular diet.  4. Encourage mother-baby interaction.  5. Patient interested in going home today, pending Attending's approval 25y  s/p uncomplicated rCS POD #1 due to previous  x2.   Dressing removed and new steristrips placed. CBC reviewed and WNL vitals reviewed and wnl.    Plan:  1. Routine post-partum care.  2. Continue Lovenox. Encourage ambulation - if pt is not ambulating please use SCDs for DVT ppx.  3. Regular diet.  4. Encourage mother-baby interaction.  5. Patient interested in going home today, pending Attending's approval

## 2020-07-01 NOTE — PROGRESS NOTE ADULT - SUBJECTIVE AND OBJECTIVE BOX
Name: JOSE LUIS PENN  MRN: [id number]  Date Admitted: 20  Location: [facility] 2EST 2007 ([facility] 2EST)  Attending: Elisha Gandara    All: No Known Allergies    Post Partum Note:     JOSE LUIS PENN is a 25y  s/p uncomplicated rCS POD #1 due to previous c/s deliveries.    SUBJECTIVE:  No acute events overnight. Pain is well controlled with PRN pain medication. No problems with ambulating, voiding, or PO intake. Has had flatus but no BM. Reports nausea and vomiting yesterday, was able to tolerate crackers afterwards. Patient is having normal lochia which is decreasing.    She is breastfeeding and the baby is latching on, supplementing with formula as well.    OBJECTIVE:  Physical exam:  General: AOx3, NAD.  Heart: RRR. S1S2.  Lungs: CTABL. Good airflow bilaterally.   Abdomen: +BS, Soft, appropriately tender, nondistended, no guarding or rebound tenderness, firm uterine fundus at umbilicus, the incision is clean dry and intact. No erythema or discharge.  Ext: No DVT signs, warm extremities.    Vital Signs Last 24 Hrs  T(C): 36.6 (2020 04:25), Max: 37 (2020 00:14)  T(F): 97.9 (2020 04:25), Max: 98.6 (2020 00:14)  HR: 65 (2020 04:25) (57 - 86)  BP: 97/61 (2020 04:25) (97/61 - 124/84)  BP(mean): --  RR: 18 (2020 04:25) (13 - 22)  SpO2: 99% (2020 04:25) (95% - 100%)    LABS: Name: JOSE LUIS PENN  MRN: [id number]  Date Admitted: 20  Location: [facility] 2EST 2007 ([facility] 2EST)  Attending: Elisha Gandara    All: No Known Allergies    Post Partum Note:     JOSE LUIS PENN is a 25y  s/p uncomplicated rCS POD #1 due to previous  x2.     SUBJECTIVE:  Overnight, pressure dressing removed and steristrips replaced. Pain is well controlled with PRN pain medication. No problems with ambulating, voiding, or PO intake. Has had flatus but no BM. Reports nausea and vomiting yesterday, was able to tolerate crackers afterwards. Patient is having normal lochia which is decreasing.    She is breastfeeding and the baby is latching on, supplementing with formula as well.  Baby A is in the NICU due to his weight. She desires circumcisions for both babies.     OBJECTIVE:  Physical exam:  General: AOx3, NAD.  Heart: RRR. S1S2.  Lungs: CTABL. Good airflow bilaterally.   Abdomen: +BS, Soft, appropriately tender, nondistended, no guarding or rebound tenderness, firm uterine fundus at umbilicus, the incision is clean dry and intact with sutures. No erythema or discharge.  Ext: No DVT signs, warm extremities.    Vital Signs Last 24 Hrs  T(C): 36.6 (2020 04:25), Max: 37 (2020 00:14)  T(F): 97.9 (2020 04:25), Max: 98.6 (2020 00:14)  HR: 65 (2020 04:25) (57 - 86)  BP: 97/61 (2020 04:25) (97/61 - 124/84)  RR: 18 (2020 04:25) (13 - 22)  SpO2: 99% (2020 04:25) (95% - 100%)    LABS:  Pending Name: JOSE LUIS PENN  MRN: [id number]  Date Admitted: 20  Location: [facility] 2EST 2007 ([facility] 2EST)  Attending: Elisha Gandara    All: No Known Allergies    Post Partum Note:     JOSE LUIS PENN is a 25y  s/p uncomplicated rCS POD #1 due to previous  x2.     SUBJECTIVE:  No acute events overnight. Pain is well controlled with PRN pain medication. No problems with ambulating, voiding, or PO intake. Has had flatus but no BM. Reports nausea and vomiting yesterday, was able to tolerate crackers afterwards. Patient is having normal lochia which is decreasing.    She is breastfeeding and the baby is latching on, supplementing with formula as well.  Baby A is in the NICU due to his weight. She desires circumcisions for both babies.     OBJECTIVE:  Physical exam:  General: AOx3, NAD.  Heart: RRR. S1S2.  Lungs: CTABL. Good airflow bilaterally.   Abdomen: +BS, Soft, appropriately tender, nondistended, no guarding or rebound tenderness, firm uterine fundus at umbilicus, bandage unsaturated and removed, steristrips saturated and replaced, the incision is clean dry and intact with sutures and steristrips. No erythema or discharge.  Ext: No DVT signs, warm extremities.    Vital Signs Last 24 Hrs  T(C): 36.6 (2020 04:25), Max: 37 (2020 00:14)  T(F): 97.9 (2020 04:25), Max: 98.6 (2020 00:14)  HR: 65 (2020 04:25) (57 - 86)  BP: 97/61 (2020 04:25) (97/61 - 124/84)  RR: 18 (2020 04:25) (13 - 22)  SpO2: 99% (2020 04:25) (95% - 100%)    LABS:  Pending Name: JOSE LUIS PENN  MRN: [id number]  Date Admitted: 20  Location: [facility] 2E 2007 ([facility] 2EST)  Attending: Elisha Gandara    All: No Known Allergies    Post Partum Note:     JOSE LUIS PENN is a 25y  s/p uncomplicated rCS POD #1 due to previous  x2.     SUBJECTIVE:  No acute events overnight. Pain is well controlled with PRN pain medication. No problems with ambulating, voiding, or PO intake. Has had flatus but no BM. Denies N/V. Patient is having normal lochia which is decreasing.    She is breastfeeding and the baby is latching on, supplementing with formula as well.    OBJECTIVE:  Physical exam:  General: AOx3, NAD.  Heart: RRR. S1S2.  Lungs: CTABL. Good airflow bilaterally.   Abdomen: +BS, Soft, appropriately tender, nondistended, no guarding or rebound tenderness, firm uterine fundus at umbilicus, bandage unsaturated and removed, steristrips saturated and replaced, the incision is clean dry and intact with sutures and steristrips. No erythema or discharge.  Ext: No DVT signs, warm extremities.    Vital Signs Last 24 Hrs  T(C): 36.6 (2020 04:25), Max: 37 (2020 00:14)  T(F): 97.9 (2020 04:25), Max: 98.6 (2020 00:14)  HR: 65 (2020 04:25) (57 - 86)  BP: 97/61 (2020 04:25) (97/61 - 124/84)  RR: 18 (2020 04:25) (13 - 22)  SpO2: 99% (2020 04:25) (95% - 100%)    LABS:  Complete Blood Count + Automated Diff (20 @ 06:47)    WBC Count: 11.29 K/uL    RBC Count: 3.53 M/uL    Hemoglobin: 10.9 g/dL    Hematocrit: 32.3 %    Mean Cell Volume: 91.5 fl    Mean Cell Hemoglobin: 30.9 pg    Mean Cell Hemoglobin Conc: 33.7 gm/dL    Red Cell Distrib Width: 14.5 %    Platelet Count - Automated: 235 K/uL    Auto Neutrophil #: 8.91 K/uL    Auto Lymphocyte #: 1.53 K/uL    Auto Monocyte #: 0.73 K/uL    Auto Eosinophil #: 0.05 K/uL    Auto Basophil #: 0.03 K/uL    Auto Neutrophil %: 78.8: Differential percentages must be correlated with absolute numbers for  clinical significance. %    Auto Lymphocyte %: 13.6 %    Auto Monocyte %: 6.5 %    Auto Eosinophil %: 0.4 %    Auto Basophil %: 0.3 %    Auto Immature Granulocyte %: 0.4 %

## 2020-07-01 NOTE — PROGRESS NOTE ADULT - ASSESSMENT
A/P: POD#1 s/p repeat C/S. Patient doing well.    [ ] Pain control PRN  [ ] Lovenox and SCDs for DVT prophylaxis.   [ ] Encourage ambulation and incentive spirometry use.  [ ] Routine postpartum and post-operative care.  [ ] Patient desires to go home today. For possible discharge home this afternoon.

## 2020-07-02 LAB — SURGICAL PATHOLOGY STUDY: SIGNIFICANT CHANGE UP

## 2020-07-08 ENCOUNTER — APPOINTMENT (OUTPATIENT)
Dept: OBGYN | Facility: CLINIC | Age: 25
End: 2020-07-08

## 2020-07-08 ENCOUNTER — APPOINTMENT (OUTPATIENT)
Dept: OBGYN | Facility: CLINIC | Age: 25
End: 2020-07-08
Payer: MEDICAID

## 2020-07-08 VITALS
DIASTOLIC BLOOD PRESSURE: 64 MMHG | SYSTOLIC BLOOD PRESSURE: 126 MMHG | HEIGHT: 56 IN | BODY MASS INDEX: 31.27 KG/M2 | WEIGHT: 139 LBS

## 2020-07-08 PROCEDURE — 99024 POSTOP FOLLOW-UP VISIT: CPT

## 2020-07-08 RX ORDER — VITAMIN C, CALCIUM, IRON, VITAMIN D3, VITAMIN E, THIAMIN, RIBOFLAVIN, NIACINAMIDE, VITAMIN B6, FOLIC ACID, IODINE, ZINC, COPPER, DOCUSATE SODIUM 120; 85; 30; 3; 20; 20; 1; 25; 2; 50; 159; 4.54; 150; 5; 400; 3.4 MG/1; MG/1; [IU]/1; MG/1; MG/1; MG/1; MG/1; MG/1; MG/1; MG/1; MG/1; MG/1; UG/1; MG/1; [IU]/1; MG/1
90-1 & 300 TABLET ORAL
Qty: 1 | Refills: 8 | Status: COMPLETED | COMMUNITY
Start: 2020-03-13 | End: 2020-07-08

## 2020-07-08 RX ORDER — FERROUS SULFATE 137(45) MG
142 (45 FE) TABLET, EXTENDED RELEASE ORAL
Qty: 1 | Refills: 2 | Status: COMPLETED | COMMUNITY
Start: 2020-03-24 | End: 2020-07-08

## 2020-07-08 NOTE — END OF VISIT
[FreeTextEntry3] : I, Chris Linares, acted solely as a scribe for Dr. Gandara on this date 07/08/2020.\par All medical record entries made by the Scribe were at my, Dr. Gandara's direction and personally dictated by me on  07/08/2020. I have reviewed the chart and agree that the record accurately reflects my personal performance of the history, physical exam, assessment and plan. I have also personally directed, reviewed, and agreed with the chart.\par \par

## 2020-07-08 NOTE — CHIEF COMPLAINT
[Post-Partum Visit] : post-partum visit [FreeTextEntry1] : 1 week c/s- 6/30/20\par female "IRIS" 6LBS 7OZ\par FORMULA FEEDING

## 2020-07-08 NOTE — HISTORY OF PRESENT ILLNESS
[___ Month(s) Ago] : [unfilled] month(s) ago [Healthy Diet] : a healthy diet [Good] : being in good health [Reproductive Age] : is of reproductive age [Last Pap ___] : Last cervical pap smear was [unfilled] [Pregnancy History] : pregnancy history: [Menarche Age: ____] : age at menarche was [unfilled] [Definite:  ___ (Date)] : the last menstrual period was [unfilled] [Regular Exercise] : not exercising regularly [Weight Concerns] : no concerns with her weight [Menstrual Problems] : reports normal menses [Contraception] : does not use contraception [Sexually Active] : is not sexually active

## 2020-07-15 ENCOUNTER — APPOINTMENT (OUTPATIENT)
Dept: OBGYN | Facility: CLINIC | Age: 25
End: 2020-07-15
Payer: MEDICAID

## 2020-07-15 VITALS
SYSTOLIC BLOOD PRESSURE: 114 MMHG | HEIGHT: 56 IN | WEIGHT: 136 LBS | BODY MASS INDEX: 30.59 KG/M2 | DIASTOLIC BLOOD PRESSURE: 70 MMHG

## 2020-07-15 PROCEDURE — 0503F POSTPARTUM CARE VISIT: CPT

## 2020-07-15 NOTE — PHYSICAL EXAM
[Awake] : awake [Alert] : alert [Soft] : soft [Oriented x3] : oriented to person, place, and time [Acute Distress] : no acute distress [Depressed Mood] : not depressed [Flat Affect] : affect not flat

## 2020-07-15 NOTE — CHIEF COMPLAINT
[Follow Up] : follow up GYN visit [FreeTextEntry1] : Incision check\par Repeat C section on 06/30/2020\par Female " Gayla" 6.7\par Bottle Feeding\par

## 2020-07-15 NOTE — HISTORY OF PRESENT ILLNESS
[Last Pap ___] : Last cervical pap smear was [unfilled] [Reproductive Age] : is of reproductive age [Pregnancy History] : pregnancy history: [Total Preg ___] : [unfilled] [Full Term ___] : [unfilled] [Living ___] : [unfilled] [Definite:  ___ (Date)] : the last menstrual period was [unfilled] [Menarche Age: ____] : age at menarche was [unfilled] [Menstrual Problems] : reports normal menses [Sexually Active] : is not sexually active [Contraception] : does not use contraception

## 2020-07-15 NOTE — END OF VISIT
[FreeTextEntry3] : I, Chris Linares, acted solely as a scribe for Dr. Hendrickson on this date 07/15/2020.\par All medical record entries made by the Scribe were at my, Dr. Hendrickson's direction and personally dictated by me on  07/15/2020. I have reviewed the chart and agree that the record accurately reflects my personal performance of the history, physical exam, assessment and plan. I have also personally directed, reviewed, and agreed with the chart.\par

## 2020-07-22 NOTE — OB NEONATOLOGY/PEDIATRICIAN DELIVERY SUMMARY - BABY A: APGAR 5 MIN HEART RATE, DELIVERY
Addended by: MADISON LUNA on: 7/22/2020 11:31 AM     Modules accepted: Orders    
(2) more than 100 beats/min

## 2020-08-13 ENCOUNTER — APPOINTMENT (OUTPATIENT)
Dept: OBGYN | Facility: CLINIC | Age: 25
End: 2020-08-13

## 2020-09-01 ENCOUNTER — APPOINTMENT (OUTPATIENT)
Dept: OBGYN | Facility: CLINIC | Age: 25
End: 2020-09-01
Payer: MEDICAID

## 2020-09-01 VITALS
WEIGHT: 137 LBS | TEMPERATURE: 97.7 F | SYSTOLIC BLOOD PRESSURE: 110 MMHG | DIASTOLIC BLOOD PRESSURE: 60 MMHG | HEIGHT: 56 IN | BODY MASS INDEX: 30.82 KG/M2

## 2020-09-01 DIAGNOSIS — Z3A.23 23 WEEKS GESTATION OF PREGNANCY: ICD-10-CM

## 2020-09-01 DIAGNOSIS — O99.019 ANEMIA COMPLICATING PREGNANCY, UNSPECIFIED TRIMESTER: ICD-10-CM

## 2020-09-01 DIAGNOSIS — Z34.93 ENCOUNTER FOR SUPERVISION OF NORMAL PREGNANCY, UNSPECIFIED, THIRD TRIMESTER: ICD-10-CM

## 2020-09-01 DIAGNOSIS — Z01.419 ENCOUNTER FOR GYNECOLOGICAL EXAMINATION (GENERAL) (ROUTINE) W/OUT ABNORMAL FINDINGS: ICD-10-CM

## 2020-09-01 DIAGNOSIS — Z78.9 OTHER SPECIFIED HEALTH STATUS: ICD-10-CM

## 2020-09-01 DIAGNOSIS — Z83.3 FAMILY HISTORY OF DIABETES MELLITUS: ICD-10-CM

## 2020-09-01 DIAGNOSIS — Z34.92 ENCOUNTER FOR SUPERVISION OF NORMAL PREGNANCY, UNSPECIFIED, SECOND TRIMESTER: ICD-10-CM

## 2020-09-01 DIAGNOSIS — Z11.1 ENCOUNTER FOR SCREENING FOR RESPIRATORY TUBERCULOSIS: ICD-10-CM

## 2020-09-01 PROCEDURE — 99395 PREV VISIT EST AGE 18-39: CPT

## 2020-09-01 NOTE — PHYSICAL EXAM
[Awake] : awake [Alert] : alert [Soft] : soft [Oriented x3] : oriented to person, place, and time [Labia Majora] : labia major [Labia Minora] : labia minora [Normal] : clitoris [Pink Rugae] : pink rugae [No Bleeding] : there was no active vaginal bleeding [Pap Obtained] : a Pap smear was performed [Uterine Adnexae] : were not tender and not enlarged [Acute Distress] : no acute distress [Mass] : no breast mass [Nipple Discharge] : no nipple discharge [Axillary LAD] : no axillary lymphadenopathy [Tender] : non tender [Distended] : not distended [Depressed Mood] : not depressed [Flat Affect] : affect not flat [No Lesions] : no genitalia lesions [Labia Majora Erythema] : no erythema of the labia majora [Labia Minora Erythema] : no erythema of the labia minora [Motion Tenderness] : there was no cervical motion tenderness [Normal Position] : in a normal position [Tenderness] : nontender [Enlarged ___ wks] : not enlarged [Mass ___ cm] : no uterine mass was palpated [Adnexa Tenderness] : were not tender [Ovarian Mass (___ Cm)] : there were no adnexal masses

## 2020-09-01 NOTE — END OF VISIT
[FreeTextEntry3] : I, Chris Linares, acted solely as a scribe for Dr. Sneed on this date 09/01/2020.\par All medical record entries made by the Scribe were at my, Dr. Sneed's direction and personally dictated by me on  09/01/2020. I have reviewed the chart and agree that the record accurately reflects my personal performance of the history, physical exam, assessment and plan. I have also personally directed, reviewed, and agreed with the chart.\par

## 2020-09-29 LAB
C TRACH RRNA SPEC QL NAA+PROBE: NOT DETECTED
CYTOLOGY CVX/VAG DOC THIN PREP: NORMAL
N GONORRHOEA RRNA SPEC QL NAA+PROBE: NOT DETECTED
SOURCE TP AMPLIFICATION: NORMAL

## 2020-12-04 ENCOUNTER — EMERGENCY (EMERGENCY)
Facility: HOSPITAL | Age: 25
LOS: 1 days | Discharge: HOME | End: 2020-12-04
Attending: STUDENT IN AN ORGANIZED HEALTH CARE EDUCATION/TRAINING PROGRAM
Payer: MEDICAID

## 2020-12-04 VITALS
TEMPERATURE: 100 F | HEIGHT: 66 IN | SYSTOLIC BLOOD PRESSURE: 119 MMHG | RESPIRATION RATE: 17 BRPM | OXYGEN SATURATION: 98 % | HEART RATE: 99 BPM | DIASTOLIC BLOOD PRESSURE: 73 MMHG

## 2020-12-04 DIAGNOSIS — Z98.891 HISTORY OF UTERINE SCAR FROM PREVIOUS SURGERY: Chronic | ICD-10-CM

## 2020-12-04 LAB
FLUAV AG NPH QL: SIGNIFICANT CHANGE UP COUNTS
FLUBV AG NPH QL: SIGNIFICANT CHANGE UP COUNTS
RSV RNA NPH QL NAA+NON-PROBE: SIGNIFICANT CHANGE UP COUNTS
SARS-COV-2 RNA SPEC QL NAA+PROBE: SIGNIFICANT CHANGE UP COUNTS

## 2020-12-04 PROCEDURE — 99283 EMERGENCY DEPT VISIT LOW MDM: CPT

## 2020-12-04 PROCEDURE — 87637 SARSCOV2&INF A&B&RSV AMP PRB: CPT

## 2020-12-04 RX ORDER — ONDANSETRON 8 MG/1
4 TABLET, FILM COATED ORAL ONCE
Refills: 0 | Status: COMPLETED | OUTPATIENT
Start: 2020-12-04 | End: 2020-12-04

## 2020-12-04 RX ORDER — IBUPROFEN 200 MG
600 TABLET ORAL ONCE
Refills: 0 | Status: COMPLETED | OUTPATIENT
Start: 2020-12-04 | End: 2020-12-04

## 2020-12-04 RX ADMIN — Medication 600 MILLIGRAM(S): at 01:05

## 2020-12-04 RX ADMIN — ONDANSETRON 4 MILLIGRAM(S): 8 TABLET, FILM COATED ORAL at 01:06

## 2020-12-04 NOTE — ED ADULT TRIAGE NOTE - CHIEF COMPLAINT QUOTE
C/o generalized abdominal pain, headache, fevers and nausea since yesterday. Denies any cough, SOB, vomiting or diarrhea. No recent travel or known sick contacts.

## 2020-12-04 NOTE — ED PROVIDER NOTE - CARE PLAN
Principal Discharge DX:	Headache  Secondary Diagnosis:	Nausea  Secondary Diagnosis:	Fever and chills

## 2020-12-04 NOTE — ED PROVIDER NOTE - NSFOLLOWUPINSTRUCTIONS_ED_ALL_ED_FT
READ ALL ATTACHED INSTRUCTIONS FOR CORONAVIRUS IMMEDIATELY   -You were tested for COVID19 (Coronavirus) and influenza virus during your visit in the Emergency Department.   -Results will take 5-7 days  -Do NOT return to work/school/public areas until your COVID test results as negative.   -SELF QUARANTINE until COVID result is available.   -Avoid contact with others.   -Wash your hands frequently. Disinfect surfaces frequently    SEEK IMMEDIATE MEDICAL CARE IF YOU HAVE ANY OF THE FOLLOWING SYMPTOMS  **If you develop worsening or new symptoms such as shortness of breath, difficulty breathing, chest pain, confusion, severe weakness, or anything concerning to you, please seek immediate medical care or return to the ER .**     Take Tylenol and Motrin as needed for Headache and fever as directed on bottle. -Avoid contact with others.   -Wash your hands frequently. Disinfect surfaces frequently    SEEK IMMEDIATE MEDICAL CARE IF YOU HAVE ANY OF THE FOLLOWING SYMPTOMS  **If you develop worsening or new symptoms such as shortness of breath, difficulty breathing, chest pain, confusion, severe weakness, neck pain, headache not relieved with Tylenol and Motrin, pain with eye movement or anything concerning to you, please seek immediate medical care or return to the ER .**     Take Tylenol and Motrin as needed for Headache and fever as directed on bottle.    Follow up with your primary medical dr within 24 to 48 hours

## 2020-12-04 NOTE — ED PROVIDER NOTE - OBJECTIVE STATEMENT
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 Highest temp 100.8F. Took Tylenol 1000 mg at 6pm. Pt denies sore throat, ear pain, cough, nasal congestion, loss of smell or taste,, chest or abdominal pain, vomiting or diarrhea. Denies body aches or dysuria. LMP 2 weeks ago. Medications are just BCP. 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 Contrary to triage note pt denies abdominal pain.  Highest temp 100.8F. Took Tylenol 1000 mg at 6pm. Pt denies sore throat, ear pain, cough, nasal congestion, loss of smell or taste,, chest or abdominal pain, vomiting or diarrhea. Denies body aches or dysuria. LMP 2 weeks ago. Medications are just BCP. Denies smoking or illicit drugs

## 2020-12-04 NOTE — ED PROVIDER NOTE - CLINICAL SUMMARY MEDICAL DECISION MAKING FREE TEXT BOX
24 y/o F with fever, chills, HA and nausea x 1 day. Flu and covid swab obtained. Motrin and Zofran given. Pt advised to self quarantine till negative result. 26 y/o F with fever, chills, HA and nausea x 1 day. Flu and covid swab obtained. Negative result. Motrin and Zofran given. Pt feeling much better. Pt advised to self quarantine till feeling better and return with any worsening symptoms

## 2020-12-04 NOTE — ED PROVIDER NOTE - CROS ED EYES ALL NEG
chest wall discomfort x 1 week.  Likely sustained muscular injury during boxing.  CXR reviewed.  Neg d-dimer, trop.  Discussed appropriate use of NSAIDs for relief of symptoms
negative...

## 2020-12-04 NOTE — ED PROVIDER NOTE - ATTENDING CONTRIBUTION TO CARE
26yo female with no pmh presents with fevers, chills, nausea and ha for 2 days. Pt denies neck pain, loc, focal neuro deficits, cp/sob/palp, cough, abd pain/v/d, urinary symptoms, recent travel and sick contacts.  Const: Awake, alert and oriented. In no acute distress. Well appearing.  HEENT: NC/AT. Moist mucous membranes.  Eyes: No scleral icterus. EOMI.  Neck:. Soft and supple. Full ROM without pain.  Cardiac: Regular rate and regular rhythm. +S1/S2. Peripheral pulses 2+ and symmetric. No LE edema.  Resp: Speaking in full sentences. No evidence of respiratory distress. No wheezes, rales or rhonchi.  Abd: Soft, non-tender, non-distended. Normal bowel sounds in all 4 quadrants. No guarding or rebound.  Back: Spine midline and non-tender. No CVAT.  Skin: No rashes, abrasions or lacerations.  Lymph: No cervical lymphadenopathy.  Neuro: Awake, alert & oriented x 3. Moves all extremities symmetrically. follows commands, motor garfield upper and lower ext 5/5, sensory symm and intact CN 2-12 grossly intact, no ataxia, no nystagmus, no dysmetria, no ddk, symm garfield, no pronator drift, neg brudinski, neg kernigs  pt afebrile here, well appearing, no meningeal signs, follow up with pmd, given strict return precautions

## 2020-12-04 NOTE — ED PROVIDER NOTE - PATIENT PORTAL LINK FT
You can access the FollowMyHealth Patient Portal offered by [facility] by registering at the following website: [email or web address]. By joining [employer]’s FollowMyHealth portal, you will also be able to view your health information using other applications (apps) compatible with our system.

## 2020-12-04 NOTE — ED ADULT NURSE NOTE - OBJECTIVE STATEMENT
Pt AAOX3, Pt c/o headache and nausea, pt states she started having a headache and nausea since yesterday, pt denies any episodes of vomiting or diarrhea, pt states she had a fever of 100.8, pt took tylenol around 6pm today, pt denies any sick contacts, pt denies chest pain/SOB, pt respirations even and unlabored, pt abdomen soft, nondistended, nontender, pt able to move all extremities well

## 2020-12-04 NOTE — ED PROVIDER NOTE - CONSTITUTIONAL, MLM
Secondary to ETOH abuse.  Rest of labs not consistent with active liver failure.     normal... Well appearing, awake, alert, oriented to person, place, time/situation and in no apparent distress.

## 2020-12-23 PROBLEM — Z01.419 ENCOUNTER FOR ANNUAL ROUTINE GYNECOLOGICAL EXAMINATION: Status: RESOLVED | Noted: 2020-09-01 | Resolved: 2020-12-23

## 2021-06-06 ENCOUNTER — EMERGENCY (EMERGENCY)
Facility: HOSPITAL | Age: 26
LOS: 1 days | Discharge: HOME | End: 2021-06-06
Attending: STUDENT IN AN ORGANIZED HEALTH CARE EDUCATION/TRAINING PROGRAM
Payer: MEDICAID

## 2021-06-06 VITALS
WEIGHT: 139.99 LBS | HEIGHT: 66 IN | RESPIRATION RATE: 17 BRPM | OXYGEN SATURATION: 96 % | DIASTOLIC BLOOD PRESSURE: 58 MMHG | SYSTOLIC BLOOD PRESSURE: 95 MMHG | TEMPERATURE: 99 F | HEART RATE: 96 BPM

## 2021-06-06 DIAGNOSIS — Z98.891 HISTORY OF UTERINE SCAR FROM PREVIOUS SURGERY: Chronic | ICD-10-CM

## 2021-06-06 PROBLEM — Z78.9 OTHER SPECIFIED HEALTH STATUS: Chronic | Status: ACTIVE | Noted: 2020-12-04

## 2021-06-06 LAB
ALBUMIN SERPL ELPH-MCNC: 3.9 G/DL — SIGNIFICANT CHANGE UP (ref 3.3–5.2)
ALP SERPL-CCNC: 108 U/L — SIGNIFICANT CHANGE UP (ref 40–120)
ALT FLD-CCNC: 13 U/L — SIGNIFICANT CHANGE UP
ANION GAP SERPL CALC-SCNC: 11 MMOL/L — SIGNIFICANT CHANGE UP (ref 5–17)
AST SERPL-CCNC: 15 U/L — SIGNIFICANT CHANGE UP
BASOPHILS # BLD AUTO: 0.04 K/UL — SIGNIFICANT CHANGE UP (ref 0–0.2)
BASOPHILS NFR BLD AUTO: 0.4 % — SIGNIFICANT CHANGE UP (ref 0–2)
BILIRUB SERPL-MCNC: <0.2 MG/DL — LOW (ref 0.4–2)
BUN SERPL-MCNC: 9 MG/DL — SIGNIFICANT CHANGE UP (ref 8–20)
CALCIUM SERPL-MCNC: 8.6 MG/DL — SIGNIFICANT CHANGE UP (ref 8.6–10.2)
CHLORIDE SERPL-SCNC: 105 MMOL/L — SIGNIFICANT CHANGE UP (ref 98–107)
CO2 SERPL-SCNC: 22 MMOL/L — SIGNIFICANT CHANGE UP (ref 22–29)
CREAT SERPL-MCNC: 0.63 MG/DL — SIGNIFICANT CHANGE UP (ref 0.5–1.3)
EOSINOPHIL # BLD AUTO: 0.07 K/UL — SIGNIFICANT CHANGE UP (ref 0–0.5)
EOSINOPHIL NFR BLD AUTO: 0.7 % — SIGNIFICANT CHANGE UP (ref 0–6)
GLUCOSE SERPL-MCNC: 154 MG/DL — HIGH (ref 70–99)
HCG SERPL-ACNC: <4 MIU/ML — SIGNIFICANT CHANGE UP
HCT VFR BLD CALC: 34.6 % — SIGNIFICANT CHANGE UP (ref 34.5–45)
HGB BLD-MCNC: 12 G/DL — SIGNIFICANT CHANGE UP (ref 11.5–15.5)
IMM GRANULOCYTES NFR BLD AUTO: 0.3 % — SIGNIFICANT CHANGE UP (ref 0–1.5)
LYMPHOCYTES # BLD AUTO: 2.69 K/UL — SIGNIFICANT CHANGE UP (ref 1–3.3)
LYMPHOCYTES # BLD AUTO: 25.6 % — SIGNIFICANT CHANGE UP (ref 13–44)
MCHC RBC-ENTMCNC: 30.4 PG — SIGNIFICANT CHANGE UP (ref 27–34)
MCHC RBC-ENTMCNC: 34.7 GM/DL — SIGNIFICANT CHANGE UP (ref 32–36)
MCV RBC AUTO: 87.6 FL — SIGNIFICANT CHANGE UP (ref 80–100)
MONOCYTES # BLD AUTO: 0.43 K/UL — SIGNIFICANT CHANGE UP (ref 0–0.9)
MONOCYTES NFR BLD AUTO: 4.1 % — SIGNIFICANT CHANGE UP (ref 2–14)
NEUTROPHILS # BLD AUTO: 7.25 K/UL — SIGNIFICANT CHANGE UP (ref 1.8–7.4)
NEUTROPHILS NFR BLD AUTO: 68.9 % — SIGNIFICANT CHANGE UP (ref 43–77)
PLATELET # BLD AUTO: 328 K/UL — SIGNIFICANT CHANGE UP (ref 150–400)
POTASSIUM SERPL-MCNC: 3.4 MMOL/L — LOW (ref 3.5–5.3)
POTASSIUM SERPL-SCNC: 3.4 MMOL/L — LOW (ref 3.5–5.3)
PROT SERPL-MCNC: 7.1 G/DL — SIGNIFICANT CHANGE UP (ref 6.6–8.7)
RBC # BLD: 3.95 M/UL — SIGNIFICANT CHANGE UP (ref 3.8–5.2)
RBC # FLD: 12.5 % — SIGNIFICANT CHANGE UP (ref 10.3–14.5)
SODIUM SERPL-SCNC: 138 MMOL/L — SIGNIFICANT CHANGE UP (ref 135–145)
WBC # BLD: 10.51 K/UL — HIGH (ref 3.8–10.5)
WBC # FLD AUTO: 10.51 K/UL — HIGH (ref 3.8–10.5)

## 2021-06-06 PROCEDURE — 99285 EMERGENCY DEPT VISIT HI MDM: CPT

## 2021-06-06 PROCEDURE — 99284 EMERGENCY DEPT VISIT MOD MDM: CPT

## 2021-06-06 PROCEDURE — 84702 CHORIONIC GONADOTROPIN TEST: CPT

## 2021-06-06 PROCEDURE — 82962 GLUCOSE BLOOD TEST: CPT

## 2021-06-06 PROCEDURE — 85025 COMPLETE CBC W/AUTO DIFF WBC: CPT

## 2021-06-06 PROCEDURE — 36415 COLL VENOUS BLD VENIPUNCTURE: CPT

## 2021-06-06 PROCEDURE — 93005 ELECTROCARDIOGRAM TRACING: CPT

## 2021-06-06 PROCEDURE — 80053 COMPREHEN METABOLIC PANEL: CPT

## 2021-06-06 PROCEDURE — 93010 ELECTROCARDIOGRAM REPORT: CPT

## 2021-06-06 RX ORDER — SODIUM CHLORIDE 9 MG/ML
1000 INJECTION INTRAMUSCULAR; INTRAVENOUS; SUBCUTANEOUS ONCE
Refills: 0 | Status: COMPLETED | OUTPATIENT
Start: 2021-06-06 | End: 2021-06-06

## 2021-06-06 RX ADMIN — SODIUM CHLORIDE 1000 MILLILITER(S): 9 INJECTION INTRAMUSCULAR; INTRAVENOUS; SUBCUTANEOUS at 14:22

## 2021-06-06 NOTE — ED ADULT TRIAGE NOTE - CHIEF COMPLAINT QUOTE
Pt. BIBA for altered mental status.  Pt. friend states that they were at [facility] when "all of a sudden she had this blank stare and wasn't responding to us.  We lowered her to the floor and called 911."  On arrival to ED, pt. not talking to staff but responding by shaking head yes and no.  Pt.  admits to eating edible prior to leaving for [facility] and has never had one prior to this episode.  MD Sykes called to evaluate pt.

## 2021-06-06 NOTE — ED ADULT NURSE NOTE - CHIEF COMPLAINT QUOTE
Pt. BIBA for altered mental status.  Pt. friend states that they were at [facility] when "all of a sudden she had this blank stare and wasn't responding to us.  We lowered her to the floor and called 911."  On arrival to ED, pt. not talking to staff but responding by shaking head yes and no.  Pt.  admits to eating edible prior to leaving for [facility] and has never had one prior to this episode.  MD Sykes called to evaluate pt.

## 2021-06-06 NOTE — ED ADULT NURSE NOTE - OBJECTIVE STATEMENT
Patient presents to ER complaining of passing out, patient was at restaurant, admits to having edible and felling dizzy, patient is awake, alert and oriented X4, resp even/unlabored, lungs CTAB, denies PMH, calm and cooperative.n

## 2021-06-06 NOTE — ED PROVIDER NOTE - OBJECTIVE STATEMENT
Pt is a 25 yo F BIBEMS for unresponsive episode.  Pt's cousin states that they were out to lunch today when they noticed patient was just staring off into space. They called her name twice but she did not respond so they laid her down and then shortly after started talking. Pt later admitted that she took an edible for the first time this morning. no cp. no sob. no n/v. no fever/chills. no other complaints.

## 2021-06-06 NOTE — ED PROVIDER NOTE - PATIENT PORTAL LINK FT
You can access the FollowMyHealth Patient Portal offered by [facility] by registering at the following website: [email or web address]. By joining [employer]’s FollowMyHealth portal, you will also be able to view your health information using other applications (apps) compatible with our system.

## 2021-06-06 NOTE — ED PROVIDER NOTE - CLINICAL SUMMARY MEDICAL DECISION MAKING FREE TEXT BOX
labs and ekg reviewed. Pt feeling better. Pt likely with reaction to edible marijuana. Pt reassured and instructed to f/up with pcp in 1-2 days. instructed to return for any new/concerning symptoms.  Pt given a copy of all results and instructed to f/up with pcp regarding any abnormal results.

## 2021-06-24 ENCOUNTER — RX RENEWAL (OUTPATIENT)
Age: 26
End: 2021-06-24

## 2021-10-08 NOTE — PROGRESS NOTE ADULT - PROVIDER SPECIALTY LIST ADULT
Called patient at this time and notified of calcium score results.  Patient verbalized understanding.    Routed to PCP  Mailed to patient.   OB no

## 2021-12-12 ENCOUNTER — EMERGENCY (EMERGENCY)
Facility: HOSPITAL | Age: 26
LOS: 1 days | Discharge: HOME | End: 2021-12-12
Attending: EMERGENCY MEDICINE
Payer: MEDICAID

## 2021-12-12 VITALS
SYSTOLIC BLOOD PRESSURE: 102 MMHG | DIASTOLIC BLOOD PRESSURE: 61 MMHG | HEART RATE: 72 BPM | RESPIRATION RATE: 17 BRPM | OXYGEN SATURATION: 100 %

## 2021-12-12 VITALS
SYSTOLIC BLOOD PRESSURE: 110 MMHG | DIASTOLIC BLOOD PRESSURE: 65 MMHG | HEART RATE: 74 BPM | OXYGEN SATURATION: 99 % | RESPIRATION RATE: 16 BRPM

## 2021-12-12 DIAGNOSIS — Z98.891 HISTORY OF UTERINE SCAR FROM PREVIOUS SURGERY: Chronic | ICD-10-CM

## 2021-12-12 LAB
ALBUMIN SERPL ELPH-MCNC: 3.8 G/DL — SIGNIFICANT CHANGE UP (ref 3.3–5.2)
ALP SERPL-CCNC: 104 U/L — SIGNIFICANT CHANGE UP (ref 40–120)
ALT FLD-CCNC: 28 U/L — SIGNIFICANT CHANGE UP
ANION GAP SERPL CALC-SCNC: 13 MMOL/L — SIGNIFICANT CHANGE UP (ref 5–17)
AST SERPL-CCNC: 16 U/L — SIGNIFICANT CHANGE UP
BASOPHILS # BLD AUTO: 0.04 K/UL — SIGNIFICANT CHANGE UP (ref 0–0.2)
BASOPHILS NFR BLD AUTO: 0.4 % — SIGNIFICANT CHANGE UP (ref 0–2)
BILIRUB SERPL-MCNC: <0.2 MG/DL — LOW (ref 0.4–2)
BUN SERPL-MCNC: 8.5 MG/DL — SIGNIFICANT CHANGE UP (ref 8–20)
CALCIUM SERPL-MCNC: 8.3 MG/DL — LOW (ref 8.6–10.2)
CHLORIDE SERPL-SCNC: 106 MMOL/L — SIGNIFICANT CHANGE UP (ref 98–107)
CO2 SERPL-SCNC: 20 MMOL/L — LOW (ref 22–29)
CREAT SERPL-MCNC: 0.44 MG/DL — LOW (ref 0.5–1.3)
EOSINOPHIL # BLD AUTO: 0.01 K/UL — SIGNIFICANT CHANGE UP (ref 0–0.5)
EOSINOPHIL NFR BLD AUTO: 0.1 % — SIGNIFICANT CHANGE UP (ref 0–6)
ETHANOL SERPL-MCNC: 56 MG/DL — HIGH (ref 0–9)
GLUCOSE SERPL-MCNC: 99 MG/DL — SIGNIFICANT CHANGE UP (ref 70–99)
HCG SERPL-ACNC: <4 MIU/ML — SIGNIFICANT CHANGE UP
HCT VFR BLD CALC: 33.1 % — LOW (ref 34.5–45)
HGB BLD-MCNC: 11.3 G/DL — LOW (ref 11.5–15.5)
IMM GRANULOCYTES NFR BLD AUTO: 0.3 % — SIGNIFICANT CHANGE UP (ref 0–1.5)
LYMPHOCYTES # BLD AUTO: 1.53 K/UL — SIGNIFICANT CHANGE UP (ref 1–3.3)
LYMPHOCYTES # BLD AUTO: 17.1 % — SIGNIFICANT CHANGE UP (ref 13–44)
MCHC RBC-ENTMCNC: 30 PG — SIGNIFICANT CHANGE UP (ref 27–34)
MCHC RBC-ENTMCNC: 34.1 GM/DL — SIGNIFICANT CHANGE UP (ref 32–36)
MCV RBC AUTO: 87.8 FL — SIGNIFICANT CHANGE UP (ref 80–100)
MONOCYTES # BLD AUTO: 0.36 K/UL — SIGNIFICANT CHANGE UP (ref 0–0.9)
MONOCYTES NFR BLD AUTO: 4 % — SIGNIFICANT CHANGE UP (ref 2–14)
NEUTROPHILS # BLD AUTO: 6.98 K/UL — SIGNIFICANT CHANGE UP (ref 1.8–7.4)
NEUTROPHILS NFR BLD AUTO: 78.1 % — HIGH (ref 43–77)
PLATELET # BLD AUTO: 380 K/UL — SIGNIFICANT CHANGE UP (ref 150–400)
POTASSIUM SERPL-MCNC: 3.7 MMOL/L — SIGNIFICANT CHANGE UP (ref 3.5–5.3)
POTASSIUM SERPL-SCNC: 3.7 MMOL/L — SIGNIFICANT CHANGE UP (ref 3.5–5.3)
PROT SERPL-MCNC: 6.7 G/DL — SIGNIFICANT CHANGE UP (ref 6.6–8.7)
RBC # BLD: 3.77 M/UL — LOW (ref 3.8–5.2)
RBC # FLD: 12.8 % — SIGNIFICANT CHANGE UP (ref 10.3–14.5)
SODIUM SERPL-SCNC: 139 MMOL/L — SIGNIFICANT CHANGE UP (ref 135–145)
WBC # BLD: 8.95 K/UL — SIGNIFICANT CHANGE UP (ref 3.8–10.5)
WBC # FLD AUTO: 8.95 K/UL — SIGNIFICANT CHANGE UP (ref 3.8–10.5)

## 2021-12-12 PROCEDURE — 36415 COLL VENOUS BLD VENIPUNCTURE: CPT

## 2021-12-12 PROCEDURE — 80053 COMPREHEN METABOLIC PANEL: CPT

## 2021-12-12 PROCEDURE — 99285 EMERGENCY DEPT VISIT HI MDM: CPT | Mod: 25

## 2021-12-12 PROCEDURE — 96374 THER/PROPH/DIAG INJ IV PUSH: CPT

## 2021-12-12 PROCEDURE — 85025 COMPLETE CBC W/AUTO DIFF WBC: CPT

## 2021-12-12 PROCEDURE — 84702 CHORIONIC GONADOTROPIN TEST: CPT

## 2021-12-12 PROCEDURE — 82962 GLUCOSE BLOOD TEST: CPT

## 2021-12-12 PROCEDURE — 80307 DRUG TEST PRSMV CHEM ANLYZR: CPT

## 2021-12-12 PROCEDURE — 99284 EMERGENCY DEPT VISIT MOD MDM: CPT

## 2021-12-12 RX ORDER — SODIUM CHLORIDE 9 MG/ML
1000 INJECTION INTRAMUSCULAR; INTRAVENOUS; SUBCUTANEOUS ONCE
Refills: 0 | Status: COMPLETED | OUTPATIENT
Start: 2021-12-12 | End: 2021-12-12

## 2021-12-12 RX ORDER — SODIUM CHLORIDE 9 MG/ML
3 INJECTION INTRAMUSCULAR; INTRAVENOUS; SUBCUTANEOUS ONCE
Refills: 0 | Status: COMPLETED | OUTPATIENT
Start: 2021-12-12 | End: 2021-12-12

## 2021-12-12 RX ORDER — ONDANSETRON 8 MG/1
8 TABLET, FILM COATED ORAL ONCE
Refills: 0 | Status: COMPLETED | OUTPATIENT
Start: 2021-12-12 | End: 2021-12-12

## 2021-12-12 RX ADMIN — SODIUM CHLORIDE 1000 MILLILITER(S): 9 INJECTION INTRAMUSCULAR; INTRAVENOUS; SUBCUTANEOUS at 04:27

## 2021-12-12 RX ADMIN — SODIUM CHLORIDE 3 MILLILITER(S): 9 INJECTION INTRAMUSCULAR; INTRAVENOUS; SUBCUTANEOUS at 00:34

## 2021-12-12 RX ADMIN — SODIUM CHLORIDE 1000 MILLILITER(S): 9 INJECTION INTRAMUSCULAR; INTRAVENOUS; SUBCUTANEOUS at 00:34

## 2021-12-12 RX ADMIN — ONDANSETRON 8 MILLIGRAM(S): 8 TABLET, FILM COATED ORAL at 00:34

## 2021-12-12 NOTE — ED PROVIDER NOTE - OBJECTIVE STATEMENT
?35 y/o female BIBEMS intoxicated. Pt vomited on arrival, not answering questions at this time but is awake and alert. ?35 y/o female BIBEMS intoxicated. Pt vomited on arrival, not answering questions at this time but is responsive to noxious stimuli, no obvious signs of trauma.

## 2021-12-12 NOTE — ED PROVIDER NOTE - PATIENT PORTAL LINK FT
You can access the FollowMyHealth Patient Portal offered by [facility] by registering at the following website: [email or web address]. By joining [employer]’s FollowMyHealth portal, you will also be able to view your health information using other applications (apps) compatible with our system.

## 2021-12-12 NOTE — ED ADULT NURSE REASSESSMENT NOTE - NS ED NURSE REASSESS COMMENT FT1
assume care of this patient at this time , patient place on CM patient responsive to verbal stimuli but immediately fall a sleep Md aware

## 2021-12-12 NOTE — ED ADULT NURSE REASSESSMENT NOTE - NS ED NURSE REASSESS COMMENT FT1
patient sleeping easy to arouse but fall a sleep immediately BP 88/53 md aware keep on CM awaiting for orders

## 2021-12-12 NOTE — ED PROVIDER NOTE - PROGRESS NOTE DETAILS
shyann: pt signed out by dr. peacock no head trauma pending sobriety--aaox 3 normal gait admits to etoh use fiance with her to take her home will dc

## 2021-12-12 NOTE — ED ADULT TRIAGE NOTE - CHIEF COMPLAINT QUOTE
Pt. brought in unresponsive after drinking alcohol  tonight. Pt. awoke on painful stimulation. pt. awake, able to protect airway.

## 2021-12-12 NOTE — ED ADULT NURSE NOTE - OBJECTIVE STATEMENT
Patient came somnolent after drinking alcohol last night. Denies trauma. No injuries noted. Patient on cardiac monitor. Able to breath freely. Awake on assessment.

## 2021-12-12 NOTE — ED PROVIDER NOTE - NSFOLLOWUPINSTRUCTIONS_ED_ALL_ED_FT
return to the ED if symptoms worsen., fever/chills, nausea/vomiting, chest pain, shortness of breath follow up with PMD within 1 week

## 2021-12-12 NOTE — ED ADULT NURSE REASSESSMENT NOTE - NS ED NURSE REASSESS COMMENT FT1
Patient received from prior shift AAOx3. Lays comfortably with cardiac monitor. Will continue to assess.

## 2021-12-22 NOTE — ED PROVIDER NOTE - NS ED MD EM SELECTION
Problem: Falls - Risk of:  Goal: Will remain free from falls  Description: Will remain free from falls  12/22/2021 0036 by Edilma Ross RN  Outcome: Ongoing  12/21/2021 2245 by Edilma Ross RN  Outcome: Ongoing  12/21/2021 2245 by Edilma Ross RN  Outcome: Ongoing     Problem: Cardiac:  Goal: Ability to maintain vital signs within normal range will improve  Description: Ability to maintain vital signs within normal range will improve  12/22/2021 0036 by Edilma Ross RN  Outcome: Ongoing  12/21/2021 2245 by Edilma Ross RN  Outcome: Ongoing  Goal: Cardiovascular alteration will improve  Description: Cardiovascular alteration will improve  12/22/2021 0036 by Edilma Ross RN  Outcome: Ongoing  12/21/2021 2245 by Edilma Ross RN  Outcome: Ongoing  12/21/2021 2245 by Edilma Ross RN  Outcome: Ongoing [id number] Detailed

## 2023-01-11 ENCOUNTER — NON-APPOINTMENT (OUTPATIENT)
Age: 28
End: 2023-01-11

## 2023-01-11 ENCOUNTER — APPOINTMENT (OUTPATIENT)
Dept: OBGYN | Facility: CLINIC | Age: 28
End: 2023-01-11
Payer: MEDICAID

## 2023-01-11 ENCOUNTER — RESULT CHARGE (OUTPATIENT)
Age: 28
End: 2023-01-11

## 2023-01-11 VITALS
HEIGHT: 56 IN | DIASTOLIC BLOOD PRESSURE: 60 MMHG | WEIGHT: 149.25 LBS | BODY MASS INDEX: 33.57 KG/M2 | SYSTOLIC BLOOD PRESSURE: 102 MMHG

## 2023-01-11 DIAGNOSIS — Z78.9 OTHER SPECIFIED HEALTH STATUS: ICD-10-CM

## 2023-01-11 LAB
HCG UR QL: POSITIVE
QUALITY CONTROL: YES

## 2023-01-11 PROCEDURE — 99213 OFFICE O/P EST LOW 20 MIN: CPT | Mod: 25

## 2023-01-11 PROCEDURE — 81025 URINE PREGNANCY TEST: CPT

## 2023-01-11 PROCEDURE — 99395 PREV VISIT EST AGE 18-39: CPT

## 2023-01-11 RX ORDER — PRENATAL VIT NO.130/IRON/FOLIC 27MG-0.8MG
TABLET ORAL
Refills: 0 | Status: ACTIVE | COMMUNITY

## 2023-01-11 RX ORDER — NORETHINDRONE ACETATE AND ETHINYL ESTRADIOL 1MG-20(21)
1-20 KIT ORAL
Qty: 3 | Refills: 1 | Status: DISCONTINUED | COMMUNITY
Start: 2021-06-24 | End: 2023-01-11

## 2023-01-11 NOTE — HISTORY OF PRESENT ILLNESS
[N] : Patient does not use contraception [Y] : Positive pregnancy history [Regular Cycle Intervals] : periods have been regular [Frequency: Q ___ days] : menstrual periods occur approximately every [unfilled] days [Menarche Age: ____] : age at menarche was [unfilled] [PGHxTotal] : 4 [facility] : 3 [PGHxPremature] : 0 [PGHxAbortions] : 0 [[facility]] : [facility] [PGHxABInduced] : 0 [PGHxABSpont] : 0 [PGHxEctopic] : 0 [PGHxMultBirths] : 0

## 2023-01-12 ENCOUNTER — NON-APPOINTMENT (OUTPATIENT)
Age: 28
End: 2023-01-12

## 2023-01-12 LAB
C TRACH RRNA SPEC QL NAA+PROBE: NOT DETECTED
N GONORRHOEA RRNA SPEC QL NAA+PROBE: NOT DETECTED
SOURCE TP AMPLIFICATION: NORMAL

## 2023-01-13 ENCOUNTER — APPOINTMENT (OUTPATIENT)
Dept: ANTEPARTUM | Facility: CLINIC | Age: 28
End: 2023-01-13
Payer: MEDICAID

## 2023-01-13 ENCOUNTER — ASOB RESULT (OUTPATIENT)
Age: 28
End: 2023-01-13

## 2023-01-13 DIAGNOSIS — O35.9XX0 MATERNAL CARE FOR (SUSPECTED) FETAL ABNORMALITY AND DAMAGE, UNSPECIFIED, NOT APPLICABLE OR UNSPECIFIED: ICD-10-CM

## 2023-01-13 PROCEDURE — 76805 OB US >/= 14 WKS SNGL FETUS: CPT

## 2023-01-16 LAB — CYTOLOGY CVX/VAG DOC THIN PREP: NORMAL

## 2023-01-17 ENCOUNTER — APPOINTMENT (OUTPATIENT)
Dept: OBGYN | Facility: CLINIC | Age: 28
End: 2023-01-17
Payer: MEDICAID

## 2023-01-17 VITALS
DIASTOLIC BLOOD PRESSURE: 66 MMHG | WEIGHT: 151 LBS | HEIGHT: 56 IN | BODY MASS INDEX: 33.97 KG/M2 | SYSTOLIC BLOOD PRESSURE: 108 MMHG

## 2023-01-17 DIAGNOSIS — Z11.3 ENCOUNTER FOR SCREENING FOR INFECTIONS WITH A PREDOMINANTLY SEXUAL MODE OF TRANSMISSION: ICD-10-CM

## 2023-01-17 DIAGNOSIS — Z87.42 PERSONAL HISTORY OF OTHER DISEASES OF THE FEMALE GENITAL TRACT: ICD-10-CM

## 2023-01-17 DIAGNOSIS — Z34.90 ENCOUNTER FOR SUPERVISION OF NORMAL PREGNANCY, UNSPECIFIED, UNSPECIFIED TRIMESTER: ICD-10-CM

## 2023-01-17 DIAGNOSIS — Z13.71 ENCOUNTER FOR NONPROCREATIVE SCREENING FOR GENETIC DISEASE CARRIER STATUS: ICD-10-CM

## 2023-01-17 DIAGNOSIS — Z34.91 ENCOUNTER FOR SUPERVISION OF NORMAL PREGNANCY, UNSPECIFIED, FIRST TRIMESTER: ICD-10-CM

## 2023-01-17 DIAGNOSIS — Z34.93 ENCOUNTER FOR SUPERVISION OF NORMAL PREGNANCY, UNSPECIFIED, THIRD TRIMESTER: ICD-10-CM

## 2023-01-17 DIAGNOSIS — Z01.419 ENCOUNTER FOR GYNECOLOGICAL EXAMINATION (GENERAL) (ROUTINE) W/OUT ABNORMAL FINDINGS: ICD-10-CM

## 2023-01-17 DIAGNOSIS — Z30.09 ENCOUNTER FOR OTHER GENERAL COUNSELING AND ADVICE ON CONTRACEPTION: ICD-10-CM

## 2023-01-17 PROCEDURE — 0501F PRENATAL FLOW SHEET: CPT

## 2023-01-17 PROCEDURE — 36415 COLL VENOUS BLD VENIPUNCTURE: CPT

## 2023-01-17 PROCEDURE — 81003 URINALYSIS AUTO W/O SCOPE: CPT | Mod: QW

## 2023-01-18 ENCOUNTER — NON-APPOINTMENT (OUTPATIENT)
Age: 28
End: 2023-01-18

## 2023-01-18 LAB
ABO + RH PNL BLD: NORMAL
ALBUMIN SERPL ELPH-MCNC: 3.4 G/DL
ALP BLD-CCNC: 119 U/L
ALT SERPL-CCNC: 13 U/L
ANION GAP SERPL CALC-SCNC: 13 MMOL/L
APPEARANCE: CLEAR
AST SERPL-CCNC: 16 U/L
B19V IGG SER QL IA: 1.84 INDEX
B19V IGG+IGM SER-IMP: NORMAL
B19V IGG+IGM SER-IMP: POSITIVE
B19V IGM FLD-ACNC: 0.08 INDEX
B19V IGM SER-ACNC: NEGATIVE
BASOPHILS # BLD AUTO: 0.03 K/UL
BASOPHILS NFR BLD AUTO: 0.4 %
BILIRUB SERPL-MCNC: <0.2 MG/DL
BILIRUBIN URINE: NEGATIVE
BLD GP AB SCN SERPL QL: NORMAL
BLOOD URINE: NEGATIVE
BUN SERPL-MCNC: 5 MG/DL
CALCIUM SERPL-MCNC: 9 MG/DL
CHLORIDE SERPL-SCNC: 103 MMOL/L
CO2 SERPL-SCNC: 21 MMOL/L
COLOR: NORMAL
CREAT SERPL-MCNC: 0.51 MG/DL
EGFR: 129 ML/MIN/1.73M2
EOSINOPHIL # BLD AUTO: 0.12 K/UL
EOSINOPHIL NFR BLD AUTO: 1.7 %
ESTIMATED AVERAGE GLUCOSE: 105 MG/DL
GLUCOSE 1H P 50 G GLC PO SERPL-MCNC: 133 MG/DL
GLUCOSE QUALITATIVE U: NEGATIVE
GLUCOSE SERPL-MCNC: 97 MG/DL
HBA1C MFR BLD HPLC: 5.3 %
HBV SURFACE AG SER QL: NONREACTIVE
HCT VFR BLD CALC: 31.9 %
HCV AB SER QL: NONREACTIVE
HCV S/CO RATIO: 0.08 S/CO
HGB A MFR BLD: 97 %
HGB A2 MFR BLD: 3 %
HGB BLD-MCNC: 10 G/DL
HGB FRACT BLD-IMP: NORMAL
HIV1+2 AB SPEC QL IA.RAPID: NONREACTIVE
IMM GRANULOCYTES NFR BLD AUTO: 0.3 %
KETONES URINE: NEGATIVE
LEUKOCYTE ESTERASE URINE: NEGATIVE
LYMPHOCYTES # BLD AUTO: 1.67 K/UL
LYMPHOCYTES NFR BLD AUTO: 23.5 %
MAN DIFF?: NORMAL
MCHC RBC-ENTMCNC: 29.1 PG
MCHC RBC-ENTMCNC: 31.3 GM/DL
MCV RBC AUTO: 92.7 FL
MONOCYTES # BLD AUTO: 0.29 K/UL
MONOCYTES NFR BLD AUTO: 4.1 %
NEUTROPHILS # BLD AUTO: 4.97 K/UL
NEUTROPHILS NFR BLD AUTO: 70 %
NITRITE URINE: NEGATIVE
PH URINE: 7
PLATELET # BLD AUTO: 392 K/UL
POTASSIUM SERPL-SCNC: 4 MMOL/L
PROT SERPL-MCNC: 6 G/DL
PROTEIN URINE: NEGATIVE
RBC # BLD: 3.44 M/UL
RBC # FLD: 15.1 %
SODIUM SERPL-SCNC: 138 MMOL/L
SPECIFIC GRAVITY URINE: 1.02
T PALLIDUM AB SER QL IA: NEGATIVE
TSH SERPL-ACNC: 1.6 UIU/ML
UROBILINOGEN URINE: NORMAL
WBC # FLD AUTO: 7.1 K/UL

## 2023-01-19 ENCOUNTER — NON-APPOINTMENT (OUTPATIENT)
Age: 28
End: 2023-01-19

## 2023-01-19 LAB
CMV IGG SERPL QL: 0.29 U/ML
CMV IGG SERPL-IMP: NEGATIVE
CMV IGM SERPL QL: <8 AU/ML
CMV IGM SERPL QL: NEGATIVE
MEV IGG FLD QL IA: 29.1 AU/ML
MEV IGG+IGM SER-IMP: POSITIVE
MUV AB SER-ACNC: POSITIVE
MUV IGG SER QL IA: 13.1 AU/ML
RUBV IGG FLD-ACNC: 0.9 INDEX
RUBV IGG SER-IMP: NORMAL
T GONDII AB SER-IMP: NEGATIVE
T GONDII AB SER-IMP: NEGATIVE
T GONDII IGG SER QL: <3 IU/ML
T GONDII IGM SER QL: <3 AU/ML
VZV AB TITR SER: POSITIVE
VZV IGG SER IF-ACNC: 486.6 INDEX

## 2023-01-22 LAB
LEAD BLD-MCNC: <1 UG/DL
M TB IFN-G BLD-IMP: NEGATIVE
QUANTIFERON TB PLUS MITOGEN MINUS NIL: 6.92 IU/ML
QUANTIFERON TB PLUS NIL: 0.02 IU/ML
QUANTIFERON TB PLUS TB1 MINUS NIL: 0 IU/ML
QUANTIFERON TB PLUS TB2 MINUS NIL: -0.01 IU/ML

## 2023-01-23 LAB — AR GENE MUT ANL BLD/T: NORMAL

## 2023-01-24 ENCOUNTER — APPOINTMENT (OUTPATIENT)
Dept: MRI IMAGING | Facility: HOSPITAL | Age: 28
End: 2023-01-24

## 2023-01-24 ENCOUNTER — OUTPATIENT (OUTPATIENT)
Dept: OUTPATIENT SERVICES | Age: 28
LOS: 1 days | End: 2023-01-24

## 2023-01-24 DIAGNOSIS — O35.9XX0 MATERNAL CARE FOR (SUSPECTED) FETAL ABNORMALITY AND DAMAGE, UNSPECIFIED, NOT APPLICABLE OR UNSPECIFIED: ICD-10-CM

## 2023-01-24 DIAGNOSIS — R93.89 ABNORMAL FINDINGS ON DIAGNOSTIC IMAGING OF OTHER SPECIFIED BODY STRUCTURES: ICD-10-CM

## 2023-01-24 DIAGNOSIS — Z98.891 HISTORY OF UTERINE SCAR FROM PREVIOUS SURGERY: Chronic | ICD-10-CM

## 2023-01-24 LAB — FMR1 GENE MUT ANL BLD/T: NORMAL

## 2023-01-25 ENCOUNTER — NON-APPOINTMENT (OUTPATIENT)
Age: 28
End: 2023-01-25

## 2023-01-26 LAB — CFTR MUT TESTED BLD/T: NEGATIVE

## 2023-01-30 ENCOUNTER — NON-APPOINTMENT (OUTPATIENT)
Age: 28
End: 2023-01-30

## 2023-02-02 ENCOUNTER — APPOINTMENT (OUTPATIENT)
Dept: ANTEPARTUM | Facility: CLINIC | Age: 28
End: 2023-02-02
Payer: MEDICAID

## 2023-02-02 ENCOUNTER — ASOB RESULT (OUTPATIENT)
Age: 28
End: 2023-02-02

## 2023-02-02 ENCOUNTER — NON-APPOINTMENT (OUTPATIENT)
Age: 28
End: 2023-02-02

## 2023-02-02 ENCOUNTER — APPOINTMENT (OUTPATIENT)
Dept: MATERNAL FETAL MEDICINE | Facility: CLINIC | Age: 28
End: 2023-02-02
Payer: MEDICAID

## 2023-02-02 VITALS
OXYGEN SATURATION: 99 % | BODY MASS INDEX: 33.97 KG/M2 | HEART RATE: 91 BPM | RESPIRATION RATE: 18 BRPM | SYSTOLIC BLOOD PRESSURE: 108 MMHG | WEIGHT: 151 LBS | DIASTOLIC BLOOD PRESSURE: 60 MMHG | HEIGHT: 56 IN

## 2023-02-02 DIAGNOSIS — O34.219 MATERNAL CARE FOR UNSPECIFIED TYPE SCAR FROM PREVIOUS CESAREAN DELIVERY: ICD-10-CM

## 2023-02-02 DIAGNOSIS — O99.213 OBESITY COMPLICATING PREGNANCY, THIRD TRIMESTER: ICD-10-CM

## 2023-02-02 DIAGNOSIS — R93.89 ABNORMAL FINDINGS ON DIAGNOSTIC IMAGING OF OTHER SPECIFIED BODY STRUCTURES: ICD-10-CM

## 2023-02-02 PROCEDURE — 99204 OFFICE O/P NEW MOD 45 MIN: CPT

## 2023-02-02 PROCEDURE — 76819 FETAL BIOPHYS PROFIL W/O NST: CPT

## 2023-02-02 NOTE — VITALS
[US Date: ___] : ultrasound performed on [unfilled]. [GA= ___ Weeks] : Results were GA of [unfilled] weeks [GA= ___ Days] : and [unfilled] day(s) [HALEIGH by US (date): ___] : The calculated HALEIGH by US is [unfilled] [By US] : this is the final HALEIGH

## 2023-02-02 NOTE — OB HISTORY
[Pregnancy History] : girl [___] : Delivery occurred at [unfilled] [Spontaneous] : Spontaneous conception [FreeTextEntry1] : [facility] due to Cavum Vergae.  Pt states she was not able to have the fetal MRI due to the lip piercing that needed to be surgically removed.  Pt had it surgically removed and MRI is scheduled on 2/20/23.  Pt states she is having the MRI due to the baby having a lump on her head.  Pt denies any medical hx. Pts last sonogram attached from 1/13/23-  EFW 22nd % and AC 52nd% and possible left kidney enlarged.    Good FM, no ctx pain or vaginal bleeding/leaking. [unknown] : the patient is unsure of the date of her LMP [Normal Amount/Duration] : was of a normal amount and duration [Spotting Between  Menses] : no spotting between menses [Regular Cycle Intervals] : periods have been regular

## 2023-02-02 NOTE — DISCUSSION/SUMMARY
[FreeTextEntry1] : We had the pleasure of seeing your patient for a Maternal-Fetal Medicine consultation today. 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 There was no ventriculomegaly or other intracranial malformations noted. Fetal MRI is scheduled to further evaluate this finding. \par \par \par Thank you for requesting a consultation on this patient. The total time spent in preparation for this visit, medical history taking, orders, review of records, counseling the patient, and writing this note was 45 minutes.\par \par At the end of our discussion, the patient indicated that her questions were answered and she seemed satisfied with our discussion. Please do not hesitate to contact us with any questions.\par \par Sincerely,\par \par \par David Anton MD, ISAAK\par Attending Physician, Maternal-Fetal Medicine\par \par

## 2023-02-02 NOTE — FAMILY HISTORY
[Age 35+ During Pregnancy] : not 35 or over during pregnancy [Reported Family History Of Birth Defects] : no congenital heart defects [Mj-Sachs Carrier] : no Mj-Sachs [Family History] : no mental retardation/autism [Reported Family History Of Genetic Disease] : no history of child defect in child of baby father

## 2023-02-08 ENCOUNTER — APPOINTMENT (OUTPATIENT)
Dept: OBGYN | Facility: CLINIC | Age: 28
End: 2023-02-08
Payer: MEDICAID

## 2023-02-08 VITALS
HEIGHT: 56 IN | BODY MASS INDEX: 34.64 KG/M2 | WEIGHT: 154 LBS | SYSTOLIC BLOOD PRESSURE: 116 MMHG | DIASTOLIC BLOOD PRESSURE: 70 MMHG

## 2023-02-08 PROCEDURE — 0502F SUBSEQUENT PRENATAL CARE: CPT

## 2023-02-08 PROCEDURE — 81003 URINALYSIS AUTO W/O SCOPE: CPT | Mod: QW

## 2023-02-15 ENCOUNTER — OUTPATIENT (OUTPATIENT)
Dept: OUTPATIENT SERVICES | Facility: HOSPITAL | Age: 28
LOS: 1 days | End: 2023-02-15

## 2023-02-15 ENCOUNTER — APPOINTMENT (OUTPATIENT)
Dept: MRI IMAGING | Facility: CLINIC | Age: 28
End: 2023-02-15
Payer: MEDICAID

## 2023-02-15 ENCOUNTER — RESULT REVIEW (OUTPATIENT)
Age: 28
End: 2023-02-15

## 2023-02-15 DIAGNOSIS — Z98.891 HISTORY OF UTERINE SCAR FROM PREVIOUS SURGERY: Chronic | ICD-10-CM

## 2023-02-15 DIAGNOSIS — R93.89 ABNORMAL FINDINGS ON DIAGNOSTIC IMAGING OF OTHER SPECIFIED BODY STRUCTURES: ICD-10-CM

## 2023-02-15 PROCEDURE — 74712 MRI FETAL SNGL/1ST GESTATION: CPT | Mod: 26

## 2023-02-23 ENCOUNTER — APPOINTMENT (OUTPATIENT)
Dept: OBGYN | Facility: CLINIC | Age: 28
End: 2023-02-23
Payer: MEDICAID

## 2023-02-23 VITALS
HEIGHT: 56 IN | SYSTOLIC BLOOD PRESSURE: 108 MMHG | BODY MASS INDEX: 34.42 KG/M2 | WEIGHT: 153 LBS | DIASTOLIC BLOOD PRESSURE: 66 MMHG

## 2023-02-23 PROCEDURE — 0502F SUBSEQUENT PRENATAL CARE: CPT

## 2023-03-02 ENCOUNTER — ASOB RESULT (OUTPATIENT)
Age: 28
End: 2023-03-02

## 2023-03-02 ENCOUNTER — APPOINTMENT (OUTPATIENT)
Dept: ANTEPARTUM | Facility: CLINIC | Age: 28
End: 2023-03-02
Payer: MEDICAID

## 2023-03-02 PROCEDURE — 76819 FETAL BIOPHYS PROFIL W/O NST: CPT

## 2023-03-02 PROCEDURE — 76816 OB US FOLLOW-UP PER FETUS: CPT

## 2023-03-03 DIAGNOSIS — Z3A.31 31 WEEKS GESTATION OF PREGNANCY: ICD-10-CM

## 2023-03-10 ENCOUNTER — APPOINTMENT (OUTPATIENT)
Dept: OBGYN | Facility: CLINIC | Age: 28
End: 2023-03-10
Payer: MEDICAID

## 2023-03-10 VITALS
WEIGHT: 153.31 LBS | HEIGHT: 56 IN | DIASTOLIC BLOOD PRESSURE: 60 MMHG | SYSTOLIC BLOOD PRESSURE: 100 MMHG | BODY MASS INDEX: 34.49 KG/M2

## 2023-03-10 PROCEDURE — 0502F SUBSEQUENT PRENATAL CARE: CPT

## 2023-03-10 PROCEDURE — 81003 URINALYSIS AUTO W/O SCOPE: CPT | Mod: QW

## 2023-03-11 LAB
HIV1+2 AB SPEC QL IA.RAPID: NONREACTIVE
T PALLIDUM AB SER QL IA: NEGATIVE

## 2023-03-12 LAB
GP B STREP DNA SPEC QL NAA+PROBE: NOT DETECTED
SOURCE GBS: NORMAL

## 2023-03-16 ENCOUNTER — APPOINTMENT (OUTPATIENT)
Dept: OBGYN | Facility: CLINIC | Age: 28
End: 2023-03-16
Payer: MEDICAID

## 2023-03-16 VITALS
WEIGHT: 154 LBS | HEIGHT: 56 IN | BODY MASS INDEX: 34.64 KG/M2 | SYSTOLIC BLOOD PRESSURE: 120 MMHG | DIASTOLIC BLOOD PRESSURE: 70 MMHG

## 2023-03-16 PROCEDURE — 81003 URINALYSIS AUTO W/O SCOPE: CPT | Mod: QW

## 2023-03-16 PROCEDURE — 0502F SUBSEQUENT PRENATAL CARE: CPT

## 2023-03-22 ENCOUNTER — APPOINTMENT (OUTPATIENT)
Dept: OBGYN | Facility: CLINIC | Age: 28
End: 2023-03-22
Payer: MEDICAID

## 2023-03-22 VITALS
DIASTOLIC BLOOD PRESSURE: 70 MMHG | HEIGHT: 56 IN | BODY MASS INDEX: 34.42 KG/M2 | SYSTOLIC BLOOD PRESSURE: 114 MMHG | WEIGHT: 153 LBS

## 2023-03-22 PROCEDURE — 81003 URINALYSIS AUTO W/O SCOPE: CPT | Mod: QW

## 2023-03-22 PROCEDURE — 0502F SUBSEQUENT PRENATAL CARE: CPT

## 2023-03-25 ENCOUNTER — TRANSCRIPTION ENCOUNTER (OUTPATIENT)
Age: 28
End: 2023-03-25

## 2023-03-26 ENCOUNTER — OUTPATIENT (OUTPATIENT)
Dept: OUTPATIENT SERVICES | Facility: HOSPITAL | Age: 28
LOS: 1 days | End: 2023-03-26
Payer: MEDICAID

## 2023-03-26 ENCOUNTER — INPATIENT (INPATIENT)
Facility: HOSPITAL | Age: 28
LOS: 2 days | Discharge: HOME | End: 2023-03-29
Attending: OBSTETRICS & GYNECOLOGY | Admitting: OBSTETRICS & GYNECOLOGY
Payer: MEDICAID

## 2023-03-26 ENCOUNTER — TRANSCRIPTION ENCOUNTER (OUTPATIENT)
Age: 28
End: 2023-03-26

## 2023-03-26 VITALS
RESPIRATION RATE: 20 BRPM | SYSTOLIC BLOOD PRESSURE: 110 MMHG | DIASTOLIC BLOOD PRESSURE: 55 MMHG | TEMPERATURE: 99 F | HEART RATE: 71 BPM

## 2023-03-26 VITALS
RESPIRATION RATE: 16 BRPM | TEMPERATURE: 98 F | SYSTOLIC BLOOD PRESSURE: 112 MMHG | HEART RATE: 74 BPM | DIASTOLIC BLOOD PRESSURE: 57 MMHG

## 2023-03-26 VITALS — HEIGHT: 56 IN | WEIGHT: 153 LBS

## 2023-03-26 DIAGNOSIS — O47.1 FALSE LABOR AT OR AFTER 37 COMPLETED WEEKS OF GESTATION: ICD-10-CM

## 2023-03-26 DIAGNOSIS — Z3A.38 38 WEEKS GESTATION OF PREGNANCY: ICD-10-CM

## 2023-03-26 DIAGNOSIS — Z98.891 HISTORY OF UTERINE SCAR FROM PREVIOUS SURGERY: Chronic | ICD-10-CM

## 2023-03-26 DIAGNOSIS — Z98.890 OTHER SPECIFIED POSTPROCEDURAL STATES: Chronic | ICD-10-CM

## 2023-03-26 DIAGNOSIS — Z98.891 HISTORY OF UTERINE SCAR FROM PREVIOUS SURGERY: ICD-10-CM

## 2023-03-26 DIAGNOSIS — O26.893 OTHER SPECIFIED PREGNANCY RELATED CONDITIONS, THIRD TRIMESTER: ICD-10-CM

## 2023-03-26 LAB
BASOPHILS # BLD AUTO: 0.03 K/UL — SIGNIFICANT CHANGE UP (ref 0–0.2)
BASOPHILS NFR BLD AUTO: 0.4 % — SIGNIFICANT CHANGE UP (ref 0–2)
BLD GP AB SCN SERPL QL: SIGNIFICANT CHANGE UP
EOSINOPHIL # BLD AUTO: 0.12 K/UL — SIGNIFICANT CHANGE UP (ref 0–0.5)
EOSINOPHIL NFR BLD AUTO: 1.8 % — SIGNIFICANT CHANGE UP (ref 0–6)
HCT VFR BLD CALC: 27.8 % — LOW (ref 34.5–45)
HGB BLD-MCNC: 9.2 G/DL — LOW (ref 11.5–15.5)
IMM GRANULOCYTES NFR BLD AUTO: 0.4 % — SIGNIFICANT CHANGE UP (ref 0–0.9)
LYMPHOCYTES # BLD AUTO: 1.94 K/UL — SIGNIFICANT CHANGE UP (ref 1–3.3)
LYMPHOCYTES # BLD AUTO: 28.4 % — SIGNIFICANT CHANGE UP (ref 13–44)
MCHC RBC-ENTMCNC: 27.5 PG — SIGNIFICANT CHANGE UP (ref 27–34)
MCHC RBC-ENTMCNC: 33.1 GM/DL — SIGNIFICANT CHANGE UP (ref 32–36)
MCV RBC AUTO: 83 FL — SIGNIFICANT CHANGE UP (ref 80–100)
MONOCYTES # BLD AUTO: 0.47 K/UL — SIGNIFICANT CHANGE UP (ref 0–0.9)
MONOCYTES NFR BLD AUTO: 6.9 % — SIGNIFICANT CHANGE UP (ref 2–14)
NEUTROPHILS # BLD AUTO: 4.23 K/UL — SIGNIFICANT CHANGE UP (ref 1.8–7.4)
NEUTROPHILS NFR BLD AUTO: 62.1 % — SIGNIFICANT CHANGE UP (ref 43–77)
PLATELET # BLD AUTO: 301 K/UL — SIGNIFICANT CHANGE UP (ref 150–400)
RBC # BLD: 3.35 M/UL — LOW (ref 3.8–5.2)
RBC # FLD: 14.1 % — SIGNIFICANT CHANGE UP (ref 10.3–14.5)
SARS-COV-2 RNA SPEC QL NAA+PROBE: SIGNIFICANT CHANGE UP
WBC # BLD: 6.82 K/UL — SIGNIFICANT CHANGE UP (ref 3.8–10.5)
WBC # FLD AUTO: 6.82 K/UL — SIGNIFICANT CHANGE UP (ref 3.8–10.5)

## 2023-03-26 PROCEDURE — 59510 CESAREAN DELIVERY: CPT | Mod: U7

## 2023-03-26 RX ORDER — FAMOTIDINE 10 MG/ML
20 INJECTION INTRAVENOUS ONCE
Refills: 0 | Status: COMPLETED | OUTPATIENT
Start: 2023-03-26 | End: 2023-03-26

## 2023-03-26 RX ORDER — CEFAZOLIN SODIUM 1 G
2000 VIAL (EA) INJECTION ONCE
Refills: 0 | Status: COMPLETED | OUTPATIENT
Start: 2023-03-26 | End: 2023-03-26

## 2023-03-26 RX ORDER — SODIUM CHLORIDE 9 MG/ML
1000 INJECTION, SOLUTION INTRAVENOUS ONCE
Refills: 0 | Status: COMPLETED | OUTPATIENT
Start: 2023-03-26 | End: 2023-03-26

## 2023-03-26 RX ORDER — CEFAZOLIN SODIUM 1 G
2000 VIAL (EA) INJECTION ONCE
Refills: 0 | Status: DISCONTINUED | OUTPATIENT
Start: 2023-03-26 | End: 2023-03-26

## 2023-03-26 RX ORDER — CITRIC ACID/SODIUM CITRATE 300-500 MG
30 SOLUTION, ORAL ORAL ONCE
Refills: 0 | Status: COMPLETED | OUTPATIENT
Start: 2023-03-26 | End: 2023-03-26

## 2023-03-26 RX ORDER — SODIUM CHLORIDE 9 MG/ML
1000 INJECTION, SOLUTION INTRAVENOUS
Refills: 0 | Status: DISCONTINUED | OUTPATIENT
Start: 2023-03-26 | End: 2023-03-27

## 2023-03-26 RX ORDER — TRANEXAMIC ACID 100 MG/ML
1000 INJECTION, SOLUTION INTRAVENOUS ONCE
Refills: 0 | Status: COMPLETED | OUTPATIENT
Start: 2023-03-26 | End: 2023-03-26

## 2023-03-26 RX ORDER — SODIUM CHLORIDE 9 MG/ML
1000 INJECTION, SOLUTION INTRAVENOUS
Refills: 0 | Status: DISCONTINUED | OUTPATIENT
Start: 2023-03-26 | End: 2023-04-09

## 2023-03-26 RX ORDER — OXYTOCIN 10 UNIT/ML
333.33 VIAL (ML) INJECTION
Qty: 20 | Refills: 0 | Status: DISCONTINUED | OUTPATIENT
Start: 2023-03-26 | End: 2023-03-29

## 2023-03-26 RX ADMIN — Medication 30 MILLILITER(S): at 22:25

## 2023-03-26 RX ADMIN — FAMOTIDINE 20 MILLIGRAM(S): 10 INJECTION INTRAVENOUS at 22:25

## 2023-03-26 RX ADMIN — SODIUM CHLORIDE 1000 MILLILITER(S): 9 INJECTION, SOLUTION INTRAVENOUS at 03:36

## 2023-03-26 RX ADMIN — Medication 2000 MILLIGRAM(S): at 22:47

## 2023-03-26 RX ADMIN — TRANEXAMIC ACID 220 MILLIGRAM(S): 100 INJECTION, SOLUTION INTRAVENOUS at 22:54

## 2023-03-26 RX ADMIN — SODIUM CHLORIDE 125 MILLILITER(S): 9 INJECTION, SOLUTION INTRAVENOUS at 05:18

## 2023-03-26 RX ADMIN — SODIUM CHLORIDE 1000 MILLILITER(S): 9 INJECTION, SOLUTION INTRAVENOUS at 20:33

## 2023-03-26 NOTE — OB RN TRIAGE NOTE - FALL HARM RISK - FACTORS NURSING JUDGEMENT
Physical Therapy Progress Note     Visit Count: 8  Plan of Care Dates: Initial: 7/10/2017 Through: 11/27/2017  Insurance Information: Visit Limit: based on medical necessity.   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 [employer]  2. N/A    Date of Onset: Surgery 6/27/17 Left Hip periacetabular osteotomy and femoral neck osteoplasty   Diagnosis Precautions:   30# Weight Bearing Left Lower Extremity with crutches  Range of Motion as tolerated  No active strengthening - No SLR  Hydrotherapy @ 3 weeks chest deep water for gait training  2-3x week for 4-6 weeks.   Chart reviewed: Relevant co-morbidities, allergies, tests and medications: Right/Left previous knee scopes.  Two healthy pregnancies.  Boyfriend is also in therapy currently.    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  Currently set to return on 9/12/17 with half days then full time on 9/30/17.      SUBJECTIVE   Pool did help to relax tension from laying and sleeping on her side.  Anxious to get rid of the crutches and for her appointment.  Went from the bed to the couch last night due to comfort and support of the upright portion of the couch.     Current pain: 1-2/10.     OBJECTIVE   Ambulates into the clinic with Bilateral axillary crutches.    Integument:  Anterior long scar: distal end reported to have been released some pus.  Mid-scar had some serosanguinous fluid and covered with a band-aide.      Gait Analysis:  30# with Bilateral axillary crutches for outside of the home currently.  Increased use of crutches throughout the day.     Utilizes wheelchair for extended distances, such as at the mall.     Range of Motion (degrees) Norm Right Left Left   Date   Initial  Active 7/10/17  Passive 7/31/17   Hip Flexion 110-120  137 70 120   Hip Extension 10-15 NT NT NT   Hip Abduction 30-50  58 22 40   Hip Internal Rotation 30-40  38 Okay with log roll 28   Hip External Rotation 40-60  60 Okay with log roll 45   * denotes pain   Comments: Gentle stretch felt at end ranges of motion     Palpation:  Tender surrounding pelvic musculature     Outcome Measures:   Lower Extremity Functional Scale: (0=extreme difficulty; 80=no difficulty)   7/10/17:  LEFS Calculated Total: 8  7/31/17: LEFS Calculated Total: 27       TREATMENT     Therapeutic Exercise   Upright bike   Recheck for progress note - see objective above  Supine bridge x 25  Sitting Physioball pelvic tilts  Sitting Physioball hip external rotation alternating Right/Left    with and without hand support at table    Therapeutic Activities   Cleaned up serosanguinous fluid from mid-scar.  Covered with band-aide.     Home Exercises Program (HEP):  Date Exercise  Comments/Updates   7/10/17 Heel Slides. Gluteal sets. Quadriceps sets. Lateral leg slides.  Tummy time x 20 minutes.     7/12/12 TA hooklying                           Plan for next session:  Passive range of motion, core strength    Ice pack to go with instructions to remove in 20 minutes. Patient verbalized her understanding.     Pain at end: 4/10    ASSESSMENT   Patient did well with treatment today.  Stable on with sitting on Physioball for core activation and stabilization.  To date the patient has made gains in motion at the hip, pelvic awareness, pain, incision healing, gait comfort, confidence and endurance.    Pain after treatment: 2/10.    Compliant with therapy visits and home exercise program: Yes  Progress toward discharge/long term goals: steady progress    Patient would continue to benefit from skilled therapy to increase  strength/stability, increase range of motion, decrease pain, increase scar tissue mobility, promote wound healing, improve balance/proprioception, reduce falls/fall risk, improve muscle coordination, improve joint mobility, improve activity tolerance, improve activities of daily living and instrumental activites of daily living, improve functional independence, improve body mechanics to address remaining functional limitation(s) in activities of daily living.     Result of above outlined education: Verbalizes understanding and Demonstrates understanding    Goals:  7/31/17: All goals ongoing at this time.   To be obtained by end of this plan of care:  1. Patient independent with modified and progressed home exercise program.  2. Patient will decrease involved hip pain to <2/10  to aid in community ambulation for activities of independent living, completing transfers including low and soft surfaces,  activities, completion of self-care tasks/dressing, returning to community activities, age appropriate activities, sitting/standing to cook/eat a meal, squatting for lifting for household independent living and sleeping undisturbed through a night.   3. Patient will increase involved hip active range of motion to hip flexion >120° to aid in stair ambulation,  activities and completion of self-care tasks/dressing.  4. Patient will increase involved hip and lower extremities strength to 5/5, 4+/5 to aid in normalization of gait for independent living, stair ambulation,  activities, completion of self-care tasks/dressing, age appropriate activities, safely driving a car, sitting/standing to cook/eat a meal and squatting for lifting for household independent living.   5. Patient will demonstrate ability to negotiate level and unlevel surfaces at variable velocities, including change of direction without increased pain or instability to return to age appropriate and community activities at prior  level of function.  6. Patient will be able to sleep 6 hours without disruption from pain.   7. Patient will score <4cm difference on Y- balance testing to aid in dynamic neuromuscular control at the knee for ADLs such as stairs and to be able to react to environmental changes.   8. Patient will score <20% difference on 3PQ testing with rate of force development.  9. Lower Extremity Functional Scale: Patient will complete form to reflect an improved score from initial score of 10 to greater than or equal to 70 (0=extreme difficulty; 80=no difficulty) to indicate pt reported improvement in function/disability/impairment (minimal detectable change: 9 points).      PLAN     Continue with formal physical therapy 2x per week for additional 16 weeks, per protocol, and MD recommendation.  Additional weekly pool treatments until gait mechanics on land are stabilized.     Activities of Daily Living/Self Care (36129)  Gait Training (24463)  Manual Therapy (07604)  Neuromuscular Re-Education (33762)  Therapeutic Activity (32086)  Therapeutic Exercise (36363)  Heat/Cold (42999)  Aquatic Therapy (95919)    THERAPY DAILY BILLING   Primary Insurance: [id number]  Secondary Insurance: N/A    Evaluation Procedures:  No evaluation codes were used on this date of service    Timed Procedures:  Therapeutic Exercise, 40 minutes    Untimed Procedures:  No untimed codes were used on this date of service     Total Treatment Time: 40 minutes    Vicki Singh, PT         ____ I agree with the therapist's recommendation.   ____ Special instructions/precautions:   ________________________________________________________________________    ____ Discharge therapy   I certify/recertify the need for these services, furnished under this plan of treatment and while under my care.   Physician Signature __________________________ Date___________Time: _______    No

## 2023-03-26 NOTE — OB PROVIDER H&P - NSHPPHYSICALEXAM_GEN_ALL_CORE
Vital Signs Last 24 Hrs  T(C): 36.8 (26 Mar 2023 20:35), Max: 37 (26 Mar 2023 02:01)  T(F): 98.2 (26 Mar 2023 20:35), Max: 98.6 (26 Mar 2023 02:01)  HR: 74 (26 Mar 2023 20:35) (55 - 75)  BP: 112/57 (26 Mar 2023 20:35) (105/59 - 113/69)  RR: 16 (26 Mar 2023 20:35) (16 - 20)      Parameters below as of 26 Mar 2023 20:35  Patient On (Oxygen Delivery Method): room air    Gen: NAD  Abd: gravid, non-tender  SVE: 0/0/-3  FHT: 130 bpm, moderate variabililty, + accels, - decels  [place]: q3-5min

## 2023-03-26 NOTE — OB PROVIDER TRIAGE NOTE - NSOBPROVIDERNOTE_OBGYN_ALL_OB_FT
A/P: JOSE LUIS PENN is a 29yo  at 38w3d by [facility] who presents for abdominal cramping pains since yesterday evening.    # Labor w/u   - tracing reactive  - q5min contractions  - for IVF to see if ctxs space  - 0/0/-3 on exam @ 0230  - will reexamine at 0430 for cervical change  - no other obstetric complaints    D/w Dr. Liao A/P: JOSE LUIS PENN is a 29yo  at 38w3d by [facility] 23 who presents for abdominal cramping pains since yesterday evening.    # Labor w/u   - tracing reactive  - q5min contractions  - for IVF to see if ctxs space  - 0/0/-3 on exam @ 0230  - will reexamine at 0430 for cervical change  - no other obstetric complaints    D/w Dr. Liao    ADDENDUM @ 0430:  Patient reexamined, cervix still 0/0/-3  S/p 2L IVF, still with 5/10 CTX pain occuring q5min on toco  Plan to c/w monitoring on maintenance fluids with repeat exam @ 0700  If still unchanged at that time, patient to be discharged home A/P: JOSE LUIS PENN is a 29yo  at 38w3d by [facility] 23 who presents for abdominal cramping pains since yesterday evening.    # Labor w/u   - tracing reactive  - q5min contractions  - for IVF to see if ctxs space  - 0/0/-3 on exam @ 0230  - will reexamine at 0430 for cervical change  - no other obstetric complaints    D/w Dr. Liao    ADDENDUM @ 0430:  Patient reexamined, cervix still 0/0/-3  S/p 2L IVF, still with 5/10 CTX pain occuring q5min on toco  Plan to c/w monitoring on maintenance fluids with repeat exam @ 0700  If still unchanged at that time, patient to be discharged home    ADDENDUM @ 0800  Patient reexamined, cervix still 0/0/-3  Patient reports that her contractions are less frequent and rated 3/10  Patient will call Dr. Feliz's office for and appointment on Monday instead of her scheduled appointment on Wednesday   Dispo: Home with precautions. Patient was advised to return to the hospital for decreased fetal movement, vaginal bleeding, leakage of fluid, and/or contractions with increased intensity or frequency.

## 2023-03-26 NOTE — OB NEONATOLOGY/PEDIATRICIAN DELIVERY SUMMARY - NSPEDSNEONOTESA_OBGYN_ALL_OB_FT
Called by Dr. Puga to attend this  for breech. Mother is a  with 3 prior c-sections who presented on labor. Serologies neg, Blood type O+. I arrived as cord was being clamped. Per nurses report baby emerged with poor tone and no respiratory effort. Once cord was clamped baby was brought to the warmer, dried and stimulated and tone improved and she developed strong cry. Apgars 9/9

## 2023-03-26 NOTE — OB RN PATIENT PROFILE - FALL HARM RISK - UNIVERSAL INTERVENTIONS
Bed in lowest position, wheels locked, appropriate side rails in place/Call bell, personal items and telephone in reach/Instruct patient to call for assistance before getting out of bed or chair/Non-slip footwear when patient is out of bed/[place] to call system/Physically safe environment - no spills, clutter or unnecessary equipment/Purposeful Proactive Rounding/Room/bathroom lighting operational, light cord in reach

## 2023-03-26 NOTE — OB RN DELIVERY SUMMARY - NSSELHIDDEN_OBGYN_ALL_OB_FT
[NS_DeliveryAttending1_OBGYN_ALL_OB_FT:[id number]=],[NS_DeliveryAssist1_OBGYN_ALL_OB_FT:[id number]=],[NS_DeliveryRN_OBGYN_ALL_OB_FT:[id number]]

## 2023-03-26 NOTE — OB PROVIDER H&P - HISTORY OF PRESENT ILLNESS
Patient is a 28 y.o.  at 38 weeks 3 days who presents to L&D for concern for contractions.   She reports being seen this morning for the same complaints. She reports feeling better after IV fluids and went home to rest. She reports resumption of contractions at 1400 that is every 5 minutes. +FM, - LOF, -VB.    This pregnancy is complicated by:  - h/o of previous  section x 3    OBHx:  - pCS at 38 weeks, secondary to failure to progress, female, 7lbs, [facility], 2014  - rCS at 39 weeks, female, 7lbs, [facility], 10/2016  - rCs at 38  Patient is a 28 y.o.  at 38 weeks 3 days who presents to L&D for concern for contractions.   She reports being seen this morning for the same complaints. She reports feeling better after IV fluids and went home to rest. She reports resumption of contractions at 1400 that is every 5 minutes. +FM, - LOF, -VB.  She reports eating at 1700.     This pregnancy is complicated by:  - h/o of previous  section x 3    OBHx:  - pCS at 38 weeks, secondary to failure to progress, female, 7lbs, [facility], 2014  - rCS at 39 weeks, female, 7lbs, [facility], 10/2016  - rCs at 38 weeks, female, 7lbs, [facility], 2021  GynHx: denies abnormal paps, STDs, ovarian cysts, fibroids  PMH: denies  PSH: CSx3, spinal surgery  Meds: PNV  All: NKDA

## 2023-03-26 NOTE — OB RN TRIAGE NOTE - NSNURSINGINSTR_OBGYN_ALL_OB_FT
Next appointment on Wednesday. Scrub for surgery given. PST instructions given. Pt instructed to contact OB if contractions persist.

## 2023-03-26 NOTE — OB PROVIDER H&P - ATTENDING COMMENTS
patient admitted for repeat csection.  Presented earlier with contractions that resolved with hydration, however patient returns with regular contractions.  On toco serena every 3 min.  Given patient has history of 3 previous csections recommend proceeding with delivery.  I reviewed that even though she isnt dilating there is a risk of uterine rupture with regular painful contractions against her uterine scar.  She understands and agrees with plan of care.  I reviewed risk of hemorrhage, adhesions, possible injury to surrounding organs.    FHRt reassuring  PReop orders  aneshtesia/nursing aware   labs pending  awaiting or

## 2023-03-26 NOTE — OB RN DELIVERY SUMMARY - NS_DELIVERYCRNA_OBGYN_ALL_OB_FT
Kailash Wolf is a 12 year old female who presents for well exam.  Patient presents unaccompanied (mother is in the car). Verbal consent obtained.     CONCERNS:  None .    ALLERGIES:   Allergies as of 10/11/2021   • (No Known Allergies)       MEDICATIONS:   Current Outpatient Medications   Medication Sig   • IBUPROFEN PO Take by mouth as needed.     No current facility-administered medications for this visit.       PROBLEM LIST:  There is no problem list on file for this patient.        DIET:  Three regular meals:  Yes.  Normal appetite/intake/variety:  Yes.  Milk:  soy and oat.  Drinking water:  bottled.  Dentist:  Yes.  Early cardiovascular disease/Dyslipidemia risk:  Low .  Contributing Family History:  None .  P  SOCIAL:  Education:  [employer]; Freshman year  Extracurricular activities:  Student counsil  Sports/exercise:  Not at this time.   Goals/careers:  Patient would like to become a psychiatrist  Job:  Not at this time  Dating:  No  Menstrual history:  Started menstruating 5/22/21  Smoke exposure:  none.  Substance use:  no.  Sexually active:  no.  Tuberculosis risk:  Low .    REVIEW OF SYSTEMS:   Recent illnesses:  None.  HEENT/Dental issues:  braces,seasonal allergies.  Hearing/Vision issues:  None. Contacts.  Skin problems:  eczema.  Pain complaints:  None.  Cardiorespiratory:  negative.  Exertional chest pain:  No.   Shortness of breath:  No.   Palpitations:  No.   Syncope:  No.  Gastrointestinal/Genitourinary:  negative.    Sleep:  Disturbances:  None.   Duration:  8 hours.  Behavioral issues:  anxiety.  Significant injuries:  None.  Surgeries/Hospitalizations:  None.        Nursing notes reviewed and accepted.    PHYSICAL EXAMINATION:  Blood pressure (!) 86/68, pulse 78, resp. rate 12, height 5' 1.75\" (1.568 m), weight 48.7 kg (107 lb 6.4 oz), last menstrual period 10/11/2021.  General:  Well-appearing female, no acute distress.  Well-nourished and well-hydrated.  Dermatologic:  No lesions or  rashes noted.  HEENT:  PERRL, EOMI (Pupils equal, round, reactive to light, extraocular movements intact), no conjunctival injection.  No scleral icterus.  Tympanic membranes with normal landmarks bilaterally.  Masked.  Neck:  Supple, thyroid normal to palpation, no masses.  Nodes:  No adenopathy.  Lungs:  Clear to auscultation.  No wheezes, rales, rhonchi.  Normal respiratory effort.  Cardiac:  Regular rate and rhythm, normal S1 S2, no murmur appreciated.  Abdomen:  Soft, nontender, non-distended.  Normoactive bowel sounds.  No organomegaly or masses.  Extremities:  Full ROM (range of motion) UE/LE (upper extremities/lower extremities). Warm, well-perfused.  No clubbing/cyanosis/edema.  Back:  No scoliosis.  Neurologic:  Grossly intact.  Strength 5/5 bilaterally.  Normal tone.  Gait normal.  Reflexes equal.    ASSESSMENT:  Well 12 year old female adolescent.    BEHAVIOR/GUIDANCE:      Puberty - discussed      Self-testicular examination- na      Accident prevention - Discussed      School achievement - Discussed      Family/peer relationships - Discussed      Regular physical activity - Discussed      Healthy food choices - Discussed      Tobacco, alcohol, drug avoidance - Discussed      Sexual activity and avoidance/safe sex - Discussed    PLAN:  Anticipatory guidance sheet.   Immunizations per orders.  RTC (Return to clinic) in 1 year  for WCE (well child examination) or sooner as needed for illness/concerns.   Leelee

## 2023-03-26 NOTE — OB PROVIDER TRIAGE NOTE - HISTORY OF PRESENT ILLNESS
JOSE LUIS PENN is a 29yo  at 38w3d by [facility] 23 who presents for abdominal cramping pains since yesterday evening. Pains are 5/10 painful and have been getting more frequent. Denies leakage of fluids, vaginal bleeding. Reports active fetal movement.     PNC w Dr. Feliz:  1. Prior CS x3    OBhx:   G1:  pCS FT 2/2 transverse lie  G2:  rCS FT uncomplicated  G3:  rCS FT uncomplicated  Gyn: denies fibroids, abnormal paps, STDs  PMH: denies  PSH: CSx3, spinal surgery (no issues with epidural anesthesia post surgery)  Med: PNV  Allergies: NKDA

## 2023-03-26 NOTE — OB RN DELIVERY SUMMARY - NS_SEPSISRSKCALC_OBGYN_ALL_OB_FT
No temperature has been documented for this patient in CPN or on the OB Flowsheet. Ensure the highest temperature during labor was documented on the OB Flowsheet.  No gestational age at birth has been documented. Ensure delivery date/time has been entered above.  Rupture of membranes must be entered above.   EOS calculated successfully. EOS Risk Factor: 0.5/1000 live births ([facility] national incidence); GA=38w3d; Temp=98.2; ROM=0.033; GBS='Negative'; Antibiotics='No antibiotics or any antibiotics < 2 hrs prior to birth'

## 2023-03-26 NOTE — OB RN INTRAOPERATIVE NOTE - NSSELHIDDEN_OBGYN_ALL_OB_FT
[NS_DeliveryAttending1_OBGYN_ALL_OB_FT:[id number]],[NS_DeliveryAssist1_OBGYN_ALL_OB_FT:[id number]=],[NS_DeliveryRN_OBGYN_ALL_OB_FT:[id number]]

## 2023-03-26 NOTE — OB RN TRIAGE NOTE - FALL HARM RISK - UNIVERSAL INTERVENTIONS
Bed in lowest position, wheels locked, appropriate side rails in place/Call bell, personal items and telephone in reach/Instruct patient to call for assistance before getting out of bed or chair/Non-slip footwear when patient is out of bed/[place] to call system/Physically safe environment - no spills, clutter or unnecessary equipment/Purposeful Proactive Rounding/Room/bathroom lighting operational, light cord in reach

## 2023-03-26 NOTE — OB PROVIDER TRIAGE NOTE - NSHPPHYSICALEXAM_GEN_ALL_CORE
Vitals:   T(C): 37.0 (03-26-23 @ 02:07), Max: 37 (03-26-23 @ 02:01)  T(F): 98.6 (03-26-23 @ 02:07), Max: 98.6 (03-26-23 @ 02:01)  HR: 71 (03-26-23 @ 02:07) (71 - 71)  BP: 110/55 (03-26-23 @ 02:07) (110/55 - 110/55)  RR: 20 (03-26-23 @ 02:07) (20 - 20)    General: AAOx3, NAD  Abd: Soft, nontender, gravid  SVE: 0/0/-3    FHT: 130bpm, reactive  [place]:  q5min    MFM sono (3/2): vertex, posterior, EFW 2605 (51%ile)  Bedside sono: deferred

## 2023-03-26 NOTE — OB PROVIDER H&P - ASSESSMENT
Patient is a 28 y.o.  at 38 weeks 3 days gestation admitted to L&D for repeat  section secondary to labor. Cat 1 FHT.    - consent  - admission labs  - Diet: NPO  - IV hydration  - continuous toco and fetal heart monitoring until surgery  - preop antibiotics 2g ancef  - GBS negative, no ppx required    Discussed with Dr. Puga

## 2023-03-26 NOTE — OB PST NOTE - FALL HARM RISK - UNIVERSAL INTERVENTIONS
She should apply a hot pack to the area (moist wash cloth over heating pad or moist wash cloth heated in microwave. Fit in tomorrow if not improved with hot pack   Bed in lowest position, wheels locked, appropriate side rails in place/Call bell, personal items and telephone in reach/Instruct patient to call for assistance before getting out of bed or chair/Non-slip footwear when patient is out of bed/[place] to call system/Physically safe environment - no spills, clutter or unnecessary equipment/Purposeful Proactive Rounding/Room/bathroom lighting operational, light cord in reach

## 2023-03-27 LAB
COVID-19 SPIKE DOMAIN AB INTERP: POSITIVE
COVID-19 SPIKE DOMAIN ANTIBODY RESULT: >250 U/ML — HIGH
HCT VFR BLD CALC: 23.5 % — LOW (ref 34.5–45)
HGB BLD-MCNC: 7.7 G/DL — LOW (ref 11.5–15.5)
MCHC RBC-ENTMCNC: 27.6 PG — SIGNIFICANT CHANGE UP (ref 27–34)
MCHC RBC-ENTMCNC: 32.8 GM/DL — SIGNIFICANT CHANGE UP (ref 32–36)
MCV RBC AUTO: 84.2 FL — SIGNIFICANT CHANGE UP (ref 80–100)
PLATELET # BLD AUTO: 276 K/UL — SIGNIFICANT CHANGE UP (ref 150–400)
RBC # BLD: 2.79 M/UL — LOW (ref 3.8–5.2)
RBC # FLD: 13.9 % — SIGNIFICANT CHANGE UP (ref 10.3–14.5)
SARS-COV-2 IGG+IGM SERPL QL IA: >250 U/ML — HIGH
SARS-COV-2 IGG+IGM SERPL QL IA: POSITIVE
T PALLIDUM AB TITR SER: NEGATIVE — SIGNIFICANT CHANGE UP
WBC # BLD: 13.75 K/UL — HIGH (ref 3.8–10.5)
WBC # FLD AUTO: 13.75 K/UL — HIGH (ref 3.8–10.5)

## 2023-03-27 RX ORDER — OXYCODONE HYDROCHLORIDE 5 MG/1
5 TABLET ORAL
Refills: 0 | Status: DISCONTINUED | OUTPATIENT
Start: 2023-03-27 | End: 2023-03-29

## 2023-03-27 RX ORDER — OXYCODONE HYDROCHLORIDE 5 MG/1
5 TABLET ORAL ONCE
Refills: 0 | Status: DISCONTINUED | OUTPATIENT
Start: 2023-03-27 | End: 2023-03-29

## 2023-03-27 RX ORDER — OXYTOCIN 10 UNIT/ML
333.33 VIAL (ML) INJECTION
Qty: 20 | Refills: 0 | Status: DISCONTINUED | OUTPATIENT
Start: 2023-03-27 | End: 2023-03-29

## 2023-03-27 RX ORDER — LANOLIN
1 OINTMENT (GRAM) TOPICAL EVERY 6 HOURS
Refills: 0 | Status: DISCONTINUED | OUTPATIENT
Start: 2023-03-27 | End: 2023-03-29

## 2023-03-27 RX ORDER — KETOROLAC TROMETHAMINE 30 MG/ML
30 SYRINGE (ML) INJECTION EVERY 6 HOURS
Refills: 0 | Status: DISCONTINUED | OUTPATIENT
Start: 2023-03-27 | End: 2023-03-27

## 2023-03-27 RX ORDER — IBUPROFEN 200 MG
600 TABLET ORAL EVERY 6 HOURS
Refills: 0 | Status: DISCONTINUED | OUTPATIENT
Start: 2023-03-27 | End: 2023-03-29

## 2023-03-27 RX ORDER — MAGNESIUM HYDROXIDE 400 MG/1
30 TABLET, CHEWABLE ORAL
Refills: 0 | Status: DISCONTINUED | OUTPATIENT
Start: 2023-03-27 | End: 2023-03-29

## 2023-03-27 RX ORDER — DIPHENHYDRAMINE HCL 50 MG
25 CAPSULE ORAL EVERY 6 HOURS
Refills: 0 | Status: DISCONTINUED | OUTPATIENT
Start: 2023-03-27 | End: 2023-03-29

## 2023-03-27 RX ORDER — SIMETHICONE 80 MG/1
80 TABLET, CHEWABLE ORAL EVERY 4 HOURS
Refills: 0 | Status: DISCONTINUED | OUTPATIENT
Start: 2023-03-27 | End: 2023-03-29

## 2023-03-27 RX ORDER — ASCORBIC ACID 60 MG
500 TABLET,CHEWABLE ORAL DAILY
Refills: 0 | Status: DISCONTINUED | OUTPATIENT
Start: 2023-03-27 | End: 2023-03-29

## 2023-03-27 RX ORDER — ACETAMINOPHEN 500 MG
975 TABLET ORAL
Refills: 0 | Status: DISCONTINUED | OUTPATIENT
Start: 2023-03-27 | End: 2023-03-29

## 2023-03-27 RX ORDER — IBUPROFEN 200 MG
600 TABLET ORAL EVERY 6 HOURS
Refills: 0 | Status: COMPLETED | OUTPATIENT
Start: 2023-03-27 | End: 2024-02-23

## 2023-03-27 RX ORDER — SODIUM CHLORIDE 9 MG/ML
1000 INJECTION, SOLUTION INTRAVENOUS
Refills: 0 | Status: DISCONTINUED | OUTPATIENT
Start: 2023-03-27 | End: 2023-03-29

## 2023-03-27 RX ORDER — FERROUS SULFATE 325(65) MG
325 TABLET ORAL DAILY
Refills: 0 | Status: DISCONTINUED | OUTPATIENT
Start: 2023-03-27 | End: 2023-03-29

## 2023-03-27 RX ORDER — TETANUS TOXOID, REDUCED DIPHTHERIA TOXOID AND ACELLULAR PERTUSSIS VACCINE, ADSORBED 5; 2.5; 8; 8; 2.5 [IU]/.5ML; [IU]/.5ML; UG/.5ML; UG/.5ML; UG/.5ML
0.5 SUSPENSION INTRAMUSCULAR ONCE
Refills: 0 | Status: DISCONTINUED | OUTPATIENT
Start: 2023-03-27 | End: 2023-03-29

## 2023-03-27 RX ADMIN — Medication 1000 MILLIUNIT(S)/MIN: at 00:02

## 2023-03-27 RX ADMIN — Medication 30 MILLIGRAM(S): at 23:33

## 2023-03-27 RX ADMIN — Medication 30 MILLIGRAM(S): at 18:55

## 2023-03-27 RX ADMIN — Medication 30 MILLIGRAM(S): at 06:18

## 2023-03-27 RX ADMIN — Medication 975 MILLIGRAM(S): at 20:57

## 2023-03-27 RX ADMIN — Medication 975 MILLIGRAM(S): at 09:25

## 2023-03-27 RX ADMIN — Medication 30 MILLIGRAM(S): at 13:00

## 2023-03-27 NOTE — DISCHARGE NOTE OB - HOSPITAL COURSE
She is now a  who presented in labor and was taken for a repeat  section. She had an uncomplicated surgery and postpartum course. Upon discharge she was passing gas, tolerating PO, ambulating, and voiding spontaneously.

## 2023-03-27 NOTE — DISCHARGE NOTE OB - PATIENT PORTAL LINK FT
You can access the FollowMyHealth Patient Portal offered by [facility] by registering at the following website: [email or web address]. By joining [facility]’s FollowMyHealth portal, you will also be able to view your health information using other applications (apps) compatible with our system.

## 2023-03-27 NOTE — DISCHARGE NOTE OB - CARE PLAN
Principal Discharge DX:	 delivery delivered  Assessment and plan of treatment:	Patient should transition to regular activity level. Resume regular diet. Patient should follow up with her OB for a postpartum checkup 3-6 weeks after delivery. Patient should call her doctor sooner if she develops a fever or uncontrolled vaginal bleeding. Please call sooner if there are any other concerns.  Secondary Diagnosis:	Anemia due to acute blood loss  Assessment and plan of treatment:	Please continue taking iron with vitamin C daily   1

## 2023-03-27 NOTE — DISCHARGE NOTE OB - PLAN OF CARE
Patient should transition to regular activity level. Resume regular diet. Patient should follow up with her OB for a postpartum checkup 3-6 weeks after delivery. Patient should call her doctor sooner if she develops a fever or uncontrolled vaginal bleeding. Please call sooner if there are any other concerns. Please continue taking iron with vitamin C daily

## 2023-03-27 NOTE — DISCHARGE NOTE OB - FOUL SMELLING VAGINAL DISCHARGE
SOB   - Multifactorial           - Unlikely HF          - Possible Anxiety          - OK to D/C home          - Advised to go to psychotherapy          - Trial of 20x2 PO Lasix x 2 days Statement Selected

## 2023-03-27 NOTE — DISCHARGE NOTE OB - NS MD DC FALL RISK RISK
For information on Fall & Injury Prevention, visit: [email or web address] OR  [email or web address] OR  https://www.cdc.gov/steadi/patient.html

## 2023-03-27 NOTE — DISCHARGE NOTE OB - CARE PROVIDER_API CALL
Benedict Feliz (DO)  Obstetrics and Gynecology  [address], [address] [place] [address]  Phone: [phone]  Fax: [phone]  Established Patient  Follow Up Time:

## 2023-03-27 NOTE — PROGRESS NOTE ADULT - SUBJECTIVE AND OBJECTIVE BOX
JOSE LUIS PENN is a 28y  now POD#1 s/p repeat  section at 38w2d, due to painful contractions in the setting of 3 previous C/S    S:    No acute events overnight.   The patient has no complaints.  Pain controlled with current treatment regimen.   She is pending ambulation.  She is tolerating clears.  Pending flatus/Cuellar in place  She endorses appropriate lochia, which is decreasing.   She is breastfeeding without difficulty.   She denies fevers, chills, nausea and vomiting.   She denies lightheadedness, dizziness, palpitations, chest pain and SOB.     O:    T(C): 36.7 (23 @ 06:24), Max: 36.8 (23 @ 20:07)  HR: 54 (23 @ 06:24) (50 - 81)  BP: 118/72 (23 @ 06:24) (97/50 - 118/72)  RR: 18 (23 @ 06:24) (16 - 18)  SpO2: 99% (23 @ 06:24) (93% - 100%)    Gen: NAD, AOx3  Pulm: Resting comfortably on room air  Abdomen:  Soft, non-tender, non-distended, +bowel sounds  Incision: Clean/dry/intact with Steris in place   Uterus:  Fundus firm below umbilicus  VE:  Expectant lochia  Ext:  Non-tender and non-edematous                          9.2    6.82  )-----------( 301      ( 26 Mar 2023 21:10 )             27.8

## 2023-03-27 NOTE — DISCHARGE NOTE OB - MEDICATION SUMMARY - MEDICATIONS TO TAKE
I will START or STAY ON the medications listed below when I get home from the hospital:    acetaminophen 325 mg oral tablet  -- 3 tab(s) by mouth every 6 hours as needed for pain  -- Indication: For pain    ibuprofen 600 mg oral tablet  -- 1 tab(s) by mouth every 6 hours as needed for pain  -- Indication: For pain    ferrous sulfate 325 mg (65 mg elemental iron) oral tablet  -- 1 tab(s) by mouth once a day  -- Indication: For anemia    ascorbic acid 500 mg oral tablet  -- 1 tab(s) by mouth once a day  -- Indication: For anemia

## 2023-03-27 NOTE — OB PROVIDER DELIVERY SUMMARY - NSPROVIDERDELIVERYNOTE_OBGYN_ALL_OB_FT
27yo  at 38w3d presenting with painful contractions in the setting of previous C/Sx3.  Patient was taken to the OR, a repeat low transverse  section was performed and a viable female infant was delivered atraumatically in breech presentation at 2333. Placenta delivered at 2334. Hysterotomy reapproximated with 0-monocryl  Rectus muscles reapproximated with 0-monocryl  Fascia reapproximated with 0-Vicryl  Subcutaneous reapproximated with 2-0 Vicryl  Skin were reapproximated with 3-0 monocryl. Excellent hemostasis was obtained at each layer of closure.  Apgars 9/9.  .  IVF 1500.  .    Dictation #[id number] 27yo  at 38w3d presenting with painful contractions in the setting of previous C/Sx3.  Patient was taken to the OR, a repeat low transverse  section was performed and a viable female infant was delivered atraumatically in breech presentation at 2333. Placenta delivered at 2334. Hysterotomy reapproximated with 0-monocryl  Rectus muscles reapproximated with 0-monocryl  Fascia reapproximated with 0-Vicryl  Subcutaneous reapproximated with 2-0 Vicryl  Skin were reapproximated with 3-0 monocryl. Excellent hemostasis was obtained at each layer of closure.  Apgars 9/9.  .  IVF 1500.  .    Dictation #[id number]    OB attending:  resident note reviewed and edited where appropriate, repeat csection, dense adhesions peritoneum to uterus and bladder to uterus, delivered double footling breech   Malena Puga MD

## 2023-03-27 NOTE — OB PROVIDER DELIVERY SUMMARY - NSSELHIDDEN_OBGYN_ALL_OB_FT
[NS_DeliveryAttending1_OBGYN_ALL_OB_FT:[id number]],[NS_DeliveryAssist1_OBGYN_ALL_OB_FT:[id number]],[NS_DeliveryRN_OBGYN_ALL_OB_FT:[id number]]

## 2023-03-28 LAB
BASOPHILS # BLD AUTO: 0.03 K/UL — SIGNIFICANT CHANGE UP (ref 0–0.2)
BASOPHILS NFR BLD AUTO: 0.2 % — SIGNIFICANT CHANGE UP (ref 0–2)
EOSINOPHIL # BLD AUTO: 0.2 K/UL — SIGNIFICANT CHANGE UP (ref 0–0.5)
EOSINOPHIL NFR BLD AUTO: 1.4 % — SIGNIFICANT CHANGE UP (ref 0–6)
HCT VFR BLD CALC: 23.6 % — LOW (ref 34.5–45)
HGB BLD-MCNC: 7.7 G/DL — LOW (ref 11.5–15.5)
IMM GRANULOCYTES NFR BLD AUTO: 1.4 % — HIGH (ref 0–0.9)
LYMPHOCYTES # BLD AUTO: 1.88 K/UL — SIGNIFICANT CHANGE UP (ref 1–3.3)
LYMPHOCYTES # BLD AUTO: 12.7 % — LOW (ref 13–44)
MCHC RBC-ENTMCNC: 27.7 PG — SIGNIFICANT CHANGE UP (ref 27–34)
MCHC RBC-ENTMCNC: 32.6 GM/DL — SIGNIFICANT CHANGE UP (ref 32–36)
MCV RBC AUTO: 84.9 FL — SIGNIFICANT CHANGE UP (ref 80–100)
MONOCYTES # BLD AUTO: 0.74 K/UL — SIGNIFICANT CHANGE UP (ref 0–0.9)
MONOCYTES NFR BLD AUTO: 5 % — SIGNIFICANT CHANGE UP (ref 2–14)
NEUTROPHILS # BLD AUTO: 11.7 K/UL — HIGH (ref 1.8–7.4)
NEUTROPHILS NFR BLD AUTO: 79.3 % — HIGH (ref 43–77)
PLATELET # BLD AUTO: 292 K/UL — SIGNIFICANT CHANGE UP (ref 150–400)
RBC # BLD: 2.78 M/UL — LOW (ref 3.8–5.2)
RBC # FLD: 14.3 % — SIGNIFICANT CHANGE UP (ref 10.3–14.5)
WBC # BLD: 14.75 K/UL — HIGH (ref 3.8–10.5)
WBC # FLD AUTO: 14.75 K/UL — HIGH (ref 3.8–10.5)

## 2023-03-28 RX ORDER — FERROUS SULFATE 325(65) MG
1 TABLET ORAL
Qty: 28 | Refills: 0
Start: 2023-03-28 | End: 2023-04-24

## 2023-03-28 RX ORDER — IBUPROFEN 200 MG
1 TABLET ORAL
Qty: 16 | Refills: 0
Start: 2023-03-28 | End: 2023-03-31

## 2023-03-28 RX ORDER — ASCORBIC ACID 60 MG
1 TABLET,CHEWABLE ORAL
Qty: 28 | Refills: 0
Start: 2023-03-28 | End: 2023-04-24

## 2023-03-28 RX ORDER — ACETAMINOPHEN 500 MG
3 TABLET ORAL
Qty: 48 | Refills: 0
Start: 2023-03-28 | End: 2023-03-31

## 2023-03-28 RX ADMIN — Medication 600 MILLIGRAM(S): at 12:08

## 2023-03-28 RX ADMIN — Medication 975 MILLIGRAM(S): at 15:32

## 2023-03-28 RX ADMIN — Medication 600 MILLIGRAM(S): at 18:34

## 2023-03-28 RX ADMIN — Medication 325 MILLIGRAM(S): at 12:09

## 2023-03-28 RX ADMIN — Medication 975 MILLIGRAM(S): at 20:29

## 2023-03-28 RX ADMIN — Medication 975 MILLIGRAM(S): at 02:59

## 2023-03-28 RX ADMIN — Medication 975 MILLIGRAM(S): at 08:42

## 2023-03-28 RX ADMIN — Medication 500 MILLIGRAM(S): at 12:09

## 2023-03-28 RX ADMIN — Medication 600 MILLIGRAM(S): at 05:08

## 2023-03-28 NOTE — PROGRESS NOTE ADULT - SUBJECTIVE AND OBJECTIVE BOX
INTERVAL HPI/OVERNIGHT EVENTS:  28y Female s/p c section under spinal anesthesia with duramorph for post op analgesia on 03/26/23    Vital Signs Last 24 Hrs  T(C): 36.9 (28 Mar 2023 04:47), Max: 37 (28 Mar 2023 00:37)  T(F): 98.4 (28 Mar 2023 04:47), Max: 98.6 (28 Mar 2023 00:37)  HR: 81 (28 Mar 2023 04:47) (62 - 81)  BP: 109/66 (28 Mar 2023 04:47) (100/62 - 132/71)  BP(mean): --  RR: 16 (28 Mar 2023 04:47) (16 - 18)  SpO2: 99% (28 Mar 2023 04:47) (98% - 100%)    Parameters below as of 28 Mar 2023 04:47  Patient On (Oxygen Delivery Method): room air            Patient satisfied    Patients pain is well controlled     No respiratory events overnight    No pruritis at this time    Patient seen and doing well     No headache      No residual numbness or weakness, sensory and motor function intact    Site not examined     No anesthetic complications or complaints noted or reported          .

## 2023-03-28 NOTE — PROGRESS NOTE ADULT - SUBJECTIVE AND OBJECTIVE BOX
JOSE LUIS PENN is a 28y  now POD#2 s/p repeat  section at 38w2d, due to painful contractions in the setting of 3 previous C/S    S:    No acute events overnight.   The patient has no complaints.  Pain moderately controlled with current treatment regimen.   She is ambulating, tolerating PO, passing flatus, voiding.  She endorses appropriate lochia, which is decreasing.   She is breastfeeding without difficulty.   She denies fevers, chills, nausea and vomiting.   She denies lightheadedness, dizziness, palpitations, chest pain and SOB.     O:    Vital Signs Last 24 Hrs  T(C): 36.9 (28 Mar 2023 04:47), Max: 37 (28 Mar 2023 00:37)  T(F): 98.4 (28 Mar 2023 04:47), Max: 98.6 (28 Mar 2023 00:37)  HR: 81 (28 Mar 2023 04:47) (54 - 81)  BP: 109/66 (28 Mar 2023 04:47) (100/62 - 132/71)  BP(mean): --  RR: 16 (28 Mar 2023 04:47) (16 - 18)  SpO2: 99% (28 Mar 2023 04:47) (97% - 100%)    Parameters below as of 28 Mar 2023 04:47  Patient On (Oxygen Delivery Method): room air        Gen: NAD, AOx3  Pulm: Resting comfortably on room air  Abdomen:  Soft, non-tender, non-distended, +bowel sounds  Incision: Clean/dry/intact with Steris in place  Uterus:  Fundus firm below umbilicus  VE:  Expectant lochia  Ext:  Non-tender and non-edematous                          7.7    13.75 )-----------( 276      ( 27 Mar 2023 15:44 )             23.5                           9.2    6.82  )-----------( 301      ( 26 Mar 2023 21:10 )             27.8        JOSE LUIS PENN is a 28y  now POD#2 s/p repeat  section at 38w2d, due to painful contractions in the setting of 3 previous C/S    S:    No acute events overnight.   The patient has no complaints.  Pain moderately controlled with current treatment regimen.   She is ambulating, tolerating PO, passing flatus, voiding.  She endorses appropriate lochia, which is decreasing.   She is breastfeeding without difficulty.   She denies fevers, chills, nausea and vomiting.   She denies lightheadedness, dizziness, palpitations, chest pain and SOB.     O:    Vital Signs Last 24 Hrs  T(C): 36.9 (28 Mar 2023 04:47), Max: 37 (28 Mar 2023 00:37)  T(F): 98.4 (28 Mar 2023 04:47), Max: 98.6 (28 Mar 2023 00:37)  HR: 81 (28 Mar 2023 04:47) (54 - 81)  BP: 109/66 (28 Mar 2023 04:47) (100/62 - 132/71)  BP(mean): --  RR: 16 (28 Mar 2023 04:47) (16 - 18)  SpO2: 99% (28 Mar 2023 04:47) (97% - 100%)    Parameters below as of 28 Mar 2023 04:47  Patient On (Oxygen Delivery Method): room air        Gen: NAD, AOx3  Pulm: Resting comfortably on room air  Abdomen:  Soft, non-tender, non-distended, +bowel sounds  Incision: Clean/dry/intact  Uterus:  Fundus firm below umbilicus  VE:  Expectant lochia  Ext:  Non-tender and non-edematous                          7.7    13.75 )-----------( 276      ( 27 Mar 2023 15:44 )             23.5                           9.2    6.82  )-----------( 301      ( 26 Mar 2023 21:10 )             27.8

## 2023-03-28 NOTE — PROGRESS NOTE ADULT - ATTENDING COMMENTS
28y  now POD#1 s/p repeat  section at 38w2d, due to painful contractions in the setting of 3 previous C/S  - post partum Hgb 9.2, consistent with acute blood loss anemia, now with minimal bleeding/lochia only, no symptoms of anemia, plan for PO Fe  - health female infant at bedside   - vital signs, lab results, and physical exam reassuring   - DVT PPX: ambulation/Lovenox  - anticipated discharge: anticipated discharge POD#2 or later
28y  now POD#2 s/p repeat  section at 38w2d, due to painful contractions in the setting of 3 previous C/S. Pt doing well, pain controlled, dorene diet, + amb, + flatus, mild lochia  HR: 81 (28 Mar 2023 04:47) (54 - 81)  BP: 109/66 (28 Mar 2023 04:47) (100/62 - 132/71)  Incizion C/D/I  POD#2 s/p R c/s @ 38+2 with 3 previous c/s in labor   - Anemia 2/2 to acute blood loss - hgb 7.7 - pt getting  Iron   - POD#2       - Pain control       - encourage amb      - diet as dorene      - pt requesting to stay until tomorrow    Miriam Ferrara MD

## 2023-03-29 ENCOUNTER — APPOINTMENT (OUTPATIENT)
Dept: OBGYN | Facility: CLINIC | Age: 28
End: 2023-03-29

## 2023-03-29 VITALS
DIASTOLIC BLOOD PRESSURE: 68 MMHG | HEART RATE: 84 BPM | RESPIRATION RATE: 16 BRPM | SYSTOLIC BLOOD PRESSURE: 105 MMHG | TEMPERATURE: 98 F | OXYGEN SATURATION: 99 %

## 2023-03-29 PROCEDURE — U0005: CPT

## 2023-03-29 PROCEDURE — 85025 COMPLETE CBC W/AUTO DIFF WBC: CPT

## 2023-03-29 PROCEDURE — 36415 COLL VENOUS BLD VENIPUNCTURE: CPT

## 2023-03-29 PROCEDURE — 86780 TREPONEMA PALLIDUM: CPT

## 2023-03-29 PROCEDURE — 86850 RBC ANTIBODY SCREEN: CPT

## 2023-03-29 PROCEDURE — 59050 FETAL MONITOR W/REPORT: CPT

## 2023-03-29 PROCEDURE — 85027 COMPLETE CBC AUTOMATED: CPT

## 2023-03-29 PROCEDURE — 86900 BLOOD TYPING SEROLOGIC ABO: CPT

## 2023-03-29 PROCEDURE — U0003: CPT

## 2023-03-29 PROCEDURE — 86901 BLOOD TYPING SEROLOGIC RH(D): CPT

## 2023-03-29 PROCEDURE — 86769 SARS-COV-2 COVID-19 ANTIBODY: CPT

## 2023-03-29 RX ADMIN — Medication 600 MILLIGRAM(S): at 00:16

## 2023-03-29 RX ADMIN — Medication 975 MILLIGRAM(S): at 02:18

## 2023-03-29 RX ADMIN — Medication 500 MILLIGRAM(S): at 12:09

## 2023-03-29 RX ADMIN — Medication 975 MILLIGRAM(S): at 08:41

## 2023-03-29 RX ADMIN — Medication 600 MILLIGRAM(S): at 05:27

## 2023-03-29 RX ADMIN — Medication 600 MILLIGRAM(S): at 12:09

## 2023-03-29 RX ADMIN — Medication 325 MILLIGRAM(S): at 12:09

## 2023-03-29 NOTE — PROGRESS NOTE ADULT - SUBJECTIVE AND OBJECTIVE BOX
JOSE LUIS PENN is a 28y  now POD#3 s/p repeat  section at 38w2d, due to painful contractions in the setting of 3 previous C/S    S:    No acute events overnight.   The patient has no complaints.  Pain controlled with current treatment regimen.   She is ambulating, tolerating PO, passing flatus, voiding, has had BM  She endorses appropriate lochia, which is decreasing.   She is breastfeeding without difficulty.   She denies fevers, chills, nausea and vomiting.   She denies lightheadedness, dizziness, palpitations, chest pain and SOB.     O:    Vital Signs Last 24 Hrs  T(C): 36.7 (29 Mar 2023 04:37), Max: 36.8 (28 Mar 2023 20:48)  T(F): 98 (29 Mar 2023 04:37), Max: 98.3 (28 Mar 2023 20:48)  HR: 84 (29 Mar 2023 04:37) (65 - 84)  BP: 105/68 (29 Mar 2023 04:37) (105/68 - 113/63)  BP(mean): --  RR: 16 (29 Mar 2023 04:37) (16 - 18)  SpO2: 99% (29 Mar 2023 04:37) (98% - 100%)    Parameters below as of 29 Mar 2023 04:37  Patient On (Oxygen Delivery Method): room air            Gen: NAD, AOx3  Pulm: Resting comfortably on room air  Abdomen:  Soft, non-tender, non-distended, +bowel sounds  Incision: Clean/dry/intact  Uterus:  Fundus firm below umbilicus  VE:  Expectant lochia  Ext:  Non-tender and non-edematous                          7.7    13.75 )-----------( 276      ( 27 Mar 2023 15:44 )             23.5                           9.2    6.82  )-----------( 301      ( 26 Mar 2023 21:10 )             27.8

## 2023-03-29 NOTE — PROGRESS NOTE ADULT - ASSESSMENT
JOSE LUIS PENN is a 28y  now POD#2 s/p repeat  section at 38w2d, due to painful contractions in the setting of 3 previous C/S  -Vital signs stable  -Hgb: 9.2 -> 7.7, on iron  -Voiding, tolerating PO, ambulating, passing flatus  -Advance care as tolerated   -Continue routine postpartum and postoperative care and education  -Healthy female infant  -Dispo: Continue inpatient management for pain control
JOSE LUIS PENN is a 28y  now POD#1 s/p repeat  section at 38w2d, due to painful contractions in the setting of 3 previous C/S  -Vital signs stable  -Hgb: 9.2 -> noon CBC pending   -Cuellar in place; pending void  -Pending solids  -Advance care as tolerated   -Continue routine postpartum and postoperative care and education  -Healthy female infant  -Dispo: Continue inpatient management
JOSE LUIS PENN is a 28y  now POD#3 s/p repeat  section at 38w2d, due to painful contractions in the setting of 3 previous C/S  -Vital signs stable  -Hgb: 9.2 -> 7.7, on iron  -Voiding, tolerating PO, ambulating, passing flatus, has had +BM  -Advance care as tolerated   -Continue routine postpartum and postoperative care and education  -Healthy female infant  -Dispo: Discharge home today pending attending approval

## 2023-03-31 ENCOUNTER — APPOINTMENT (OUTPATIENT)
Dept: ANTEPARTUM | Facility: CLINIC | Age: 28
End: 2023-03-31

## 2023-04-03 ENCOUNTER — APPOINTMENT (OUTPATIENT)
Dept: OBGYN | Facility: HOSPITAL | Age: 28
End: 2023-04-03

## 2023-04-11 ENCOUNTER — NON-APPOINTMENT (OUTPATIENT)
Age: 28
End: 2023-04-11

## 2023-04-19 ENCOUNTER — APPOINTMENT (OUTPATIENT)
Dept: OBGYN | Facility: CLINIC | Age: 28
End: 2023-04-19
Payer: MEDICAID

## 2023-04-19 VITALS
WEIGHT: 143.8 LBS | SYSTOLIC BLOOD PRESSURE: 130 MMHG | BODY MASS INDEX: 32.35 KG/M2 | HEIGHT: 56 IN | DIASTOLIC BLOOD PRESSURE: 88 MMHG

## 2023-04-19 PROCEDURE — 0503F POSTPARTUM CARE VISIT: CPT

## 2023-04-19 NOTE — HISTORY OF PRESENT ILLNESS
[Postpartum Follow Up] : postpartum follow up [Complications:___] : no complications [Repeat C/S] : delivered by  section (repeat) [Breastfeeding] : not currently nursing [S/Sx PP Depression] : no signs/symptoms of postpartum depression [Clean/Dry/Intact] : clean, dry and intact [Erythema] : not erythematous [Doing Well] : is doing well [No Sign of Infection] : is showing no signs of infection [Excellent Pain Control] : has excellent pain control [None] : None

## 2023-04-26 LAB
BILIRUB UR QL STRIP: NORMAL
GLUCOSE UR-MCNC: NORMAL
HCG UR QL: 0.2 EU/DL
HGB UR QL STRIP.AUTO: NORMAL
KETONES UR-MCNC: NORMAL
LEUKOCYTE ESTERASE UR QL STRIP: NORMAL
NITRITE UR QL STRIP: NORMAL
PH UR STRIP: 7
PROT UR STRIP-MCNC: NORMAL
SP GR UR STRIP: 1.01

## 2023-04-28 LAB
BILIRUB UR QL STRIP: NEGATIVE
BILIRUB UR QL STRIP: NORMAL
GLUCOSE UR-MCNC: NEGATIVE
GLUCOSE UR-MCNC: NORMAL
HCG UR QL: 0.2 EU/DL
HCG UR QL: 0.2 EU/DL
HGB UR QL STRIP.AUTO: NEGATIVE
HGB UR QL STRIP.AUTO: NORMAL
KETONES UR-MCNC: NEGATIVE
KETONES UR-MCNC: NORMAL
LEUKOCYTE ESTERASE UR QL STRIP: ABNORMAL
LEUKOCYTE ESTERASE UR QL STRIP: NEGATIVE
NITRITE UR QL STRIP: NEGATIVE
NITRITE UR QL STRIP: NORMAL
PH UR STRIP: 6
PH UR STRIP: 6.5
PROT UR STRIP-MCNC: NEGATIVE
PROT UR STRIP-MCNC: NORMAL
SP GR UR STRIP: 1.01
SP GR UR STRIP: 1.02

## 2023-05-01 LAB
BILIRUB UR QL STRIP: NEGATIVE
GLUCOSE UR-MCNC: NEGATIVE
HCG UR QL: 1 EU/DL
HGB UR QL STRIP.AUTO: NEGATIVE
KETONES UR-MCNC: NEGATIVE
LEUKOCYTE ESTERASE UR QL STRIP: NORMAL
NITRITE UR QL STRIP: NEGATIVE
PH UR STRIP: 6
PROT UR STRIP-MCNC: NEGATIVE
SP GR UR STRIP: 1.01

## 2023-05-08 LAB
BILIRUB UR QL STRIP: NORMAL
CLARITY UR: CLEAR
COLLECTION METHOD: NORMAL
GLUCOSE UR-MCNC: NORMAL
HCG UR QL: 0.2 EU/DL
HGB UR QL STRIP.AUTO: NORMAL
KETONES UR-MCNC: NORMAL
LEUKOCYTE ESTERASE UR QL STRIP: NORMAL
NITRITE UR QL STRIP: NORMAL
PH UR STRIP: 6
PROT UR STRIP-MCNC: NORMAL
SP GR UR STRIP: 1.01

## 2023-05-09 NOTE — OB RN DELIVERY SUMMARY - NS_SKINTOSKINA_OBGYN_ALL_OB
200mg testosterone cypionate administered, tolerated well    Was done through completion of first feed

## 2023-05-15 ENCOUNTER — APPOINTMENT (OUTPATIENT)
Dept: OBGYN | Facility: CLINIC | Age: 28
End: 2023-05-15
Payer: MEDICAID

## 2023-05-15 VITALS
BODY MASS INDEX: 32.84 KG/M2 | SYSTOLIC BLOOD PRESSURE: 126 MMHG | DIASTOLIC BLOOD PRESSURE: 80 MMHG | HEIGHT: 56 IN | WEIGHT: 146 LBS

## 2023-05-15 DIAGNOSIS — Z30.09 ENCOUNTER FOR OTHER GENERAL COUNSELING AND ADVICE ON CONTRACEPTION: ICD-10-CM

## 2023-05-15 PROCEDURE — 0503F POSTPARTUM CARE VISIT: CPT

## 2023-05-15 NOTE — HISTORY OF PRESENT ILLNESS
[Postpartum Follow Up] : postpartum follow up [Complications:___] : no complications [Primary C/S] : delivered by  section [Breastfeeding] : not currently nursing [Intended Contraception] : Intended Contraception: [IUD] : intrauterine device [S/Sx PP Depression] : no signs/symptoms of postpartum depression [Clean/Dry/Intact] : clean, dry and intact [Erythema] : not erythematous [Back to Normal] : is back to normal in size [Mild] : mild vaginal bleeding [Normal] : the vagina was normal [Cervix Sample Taken] : cervical sample not taken for a Pap smear [Examination Of The Breasts] : breasts are normal [Doing Well] : is doing well [No Sign of Infection] : is showing no signs of infection [Excellent Pain Control] : has excellent pain control [None] : None

## 2023-06-02 ENCOUNTER — APPOINTMENT (OUTPATIENT)
Dept: OBGYN | Facility: CLINIC | Age: 28
End: 2023-06-02

## 2023-06-02 VITALS
HEIGHT: 56 IN | DIASTOLIC BLOOD PRESSURE: 80 MMHG | SYSTOLIC BLOOD PRESSURE: 120 MMHG | WEIGHT: 146 LBS | BODY MASS INDEX: 32.84 KG/M2

## 2023-06-02 LAB
HCG UR QL: NEGATIVE
QUALITY CONTROL: YES

## 2023-06-03 NOTE — ED ADULT NURSE NOTE - CAS TRG GEN SKIN CONDITION
"PRIMARY DIAGNOSIS: GASTROENTERITIS    OUTPATIENT/OBSERVATION GOALS TO BE MET BEFORE DISCHARGE  1. Orthostatic performed: No    2. Tolerating PO fluid and/or antibiotics (if applicable): Yes, no concurrent abx at this time    3. Nausea/Vomiting/Diarrhea symptoms improved: No,  watery, loose, and brown    4. Pain status: Improved with use of alternative comfort measures i.e.: heat and positioning    5. Return to near baseline physical activity: No    Discharge Planner Nurse   Safe discharge environment identified: Yes  Barriers to discharge: Yes       Entered by: Xochilt Fitzgerald RN 06/03/2023 5:23 PM   Pt A/O x4. Requires a Tamil , but can follow simple commands. Ax1 -- please note pt refuses the gait belt and walker, states \"I'm ok without it.\" Pt has a very unsteady gait to the bathroom, holds your hand while ambulating, but also holds onto the wall for support. Thoroughly educated the pt that she needs the assistive devices as it is for her own safety, educated about the risk for falls, and still refused. Notified provider whom reinforced education, and still refused. NaCl 0.9 infusing at 75 mL/hr. Clear liquid diet, denies abdominal pain. PT consult.  Please review provider order for any additional goals.   Nurse to notify provider when observation goals have been met and patient is ready for discharge.  " Warm

## 2023-06-16 ENCOUNTER — APPOINTMENT (OUTPATIENT)
Dept: OBGYN | Facility: CLINIC | Age: 28
End: 2023-06-16
Payer: MEDICAID

## 2023-06-16 ENCOUNTER — ASOB RESULT (OUTPATIENT)
Age: 28
End: 2023-06-16

## 2023-06-16 ENCOUNTER — APPOINTMENT (OUTPATIENT)
Dept: ANTEPARTUM | Facility: CLINIC | Age: 28
End: 2023-06-16
Payer: MEDICAID

## 2023-06-16 ENCOUNTER — RESULT CHARGE (OUTPATIENT)
Age: 28
End: 2023-06-16

## 2023-06-16 VITALS
HEIGHT: 56 IN | BODY MASS INDEX: 32.84 KG/M2 | WEIGHT: 146 LBS | DIASTOLIC BLOOD PRESSURE: 60 MMHG | SYSTOLIC BLOOD PRESSURE: 112 MMHG

## 2023-06-16 DIAGNOSIS — Z30.430 ENCOUNTER FOR INSERTION OF INTRAUTERINE CONTRACEPTIVE DEVICE: ICD-10-CM

## 2023-06-16 LAB
HCG UR QL: NEGATIVE
QUALITY CONTROL: YES

## 2023-06-16 PROCEDURE — 81025 URINE PREGNANCY TEST: CPT

## 2023-06-16 PROCEDURE — 76830 TRANSVAGINAL US NON-OB: CPT

## 2023-06-16 PROCEDURE — 76856 US EXAM PELVIC COMPLETE: CPT | Mod: 59

## 2023-06-16 PROCEDURE — 58300 INSERT INTRAUTERINE DEVICE: CPT

## 2023-06-16 NOTE — PROCEDURE
[IUD Placement] : intrauterine device (IUD) placement [Time out performed] : Pre-procedure time out performed.  Patient's name, date of birth and procedure confirmed. [Consent Obtained] : Consent obtained [Prevention of Pregnancy] : prevention of pregnancy [Risks] : risks [Benefits] : benefits [Alternatives] : alternatives [Patient] : patient [Infection] : infection [Bleeding] : bleeding [Pain] : pain [Expulsion] : expulsion [Failure] : failure [Uterine Perforation] : uterine perforation [Neg Pregnancy Test] : negative pregnancy test [Betadine] : Betadine [Tenaculum] : Tenaculum [Easy Passage] : Easy passage [Mirena IUD] : Mirena IUD [Tolerated Well] : Patient tolerated the procedure well [No Complications] : No complications [de-identified] : [id number] [de-identified] : 1/25

## 2023-07-13 ENCOUNTER — APPOINTMENT (OUTPATIENT)
Dept: OBGYN | Facility: CLINIC | Age: 28
End: 2023-07-13

## 2023-07-13 ENCOUNTER — APPOINTMENT (OUTPATIENT)
Dept: OBGYN | Facility: CLINIC | Age: 28
End: 2023-07-13
Payer: MEDICAID

## 2023-07-13 VITALS
SYSTOLIC BLOOD PRESSURE: 120 MMHG | BODY MASS INDEX: 34.23 KG/M2 | DIASTOLIC BLOOD PRESSURE: 60 MMHG | HEIGHT: 56 IN | WEIGHT: 152.19 LBS

## 2023-07-13 DIAGNOSIS — Z97.5 PRESENCE OF (INTRAUTERINE) CONTRACEPTIVE DEVICE: ICD-10-CM

## 2023-07-13 PROCEDURE — 99213 OFFICE O/P EST LOW 20 MIN: CPT

## 2023-07-13 RX ORDER — LEVONORGESTREL 52 MG/1
20 INTRAUTERINE DEVICE INTRAUTERINE
Refills: 0 | Status: ACTIVE | COMMUNITY

## 2023-10-10 NOTE — ED ADULT TRIAGE NOTE - BSA (M2)
From: Edna Stallworth  To: Jeffry Kong  Sent: 10/6/2023 3:44 PM EDT  Subject: Gabapentin and glipizide    I tried to call the office and I sent a rx request. [facility] said they called in a rx request on 10/4 and today 10/6 for both of these and had not heard back from you. If you could please, s3nd these to [facility], I would appreciate it. I will see you next month. 1.72

## 2024-01-25 ENCOUNTER — EMERGENCY (EMERGENCY)
Facility: HOSPITAL | Age: 29
LOS: 1 days | Discharge: HOME | End: 2024-01-25
Attending: EMERGENCY MEDICINE
Payer: MEDICAID

## 2024-01-25 VITALS
RESPIRATION RATE: 18 BRPM | HEIGHT: 56 IN | SYSTOLIC BLOOD PRESSURE: 123 MMHG | HEART RATE: 97 BPM | OXYGEN SATURATION: 99 % | TEMPERATURE: 99 F | WEIGHT: 149.91 LBS | DIASTOLIC BLOOD PRESSURE: 82 MMHG

## 2024-01-25 DIAGNOSIS — Z98.890 OTHER SPECIFIED POSTPROCEDURAL STATES: Chronic | ICD-10-CM

## 2024-01-25 DIAGNOSIS — Z98.891 HISTORY OF UTERINE SCAR FROM PREVIOUS SURGERY: Chronic | ICD-10-CM

## 2024-01-25 LAB
HADV DNA SPEC QL NAA+PROBE: DETECTED
RAPID RVP RESULT: DETECTED
RV+EV RNA SPEC QL NAA+PROBE: DETECTED
SARS-COV-2 RNA SPEC QL NAA+PROBE: SIGNIFICANT CHANGE UP

## 2024-01-25 PROCEDURE — 99283 EMERGENCY DEPT VISIT LOW MDM: CPT

## 2024-01-25 PROCEDURE — 0225U NFCT DS DNA&RNA 21 SARSCOV2: CPT

## 2024-01-25 NOTE — ED ADULT NURSE NOTE - NSFALLASSESSNEED_ED_ALL_ED
Noted.   Paperwork updated and routed   Maggie Hanson, [facility] October 15, 2020      no

## 2024-01-25 NOTE — ED PROVIDER NOTE - CLINICAL SUMMARY MEDICAL DECISION MAKING FREE TEXT BOX
29F with viral URI sx. Well appearing on exam. Lungs CTAB. Swab and to fu with pcp. Discussed return precautions.

## 2024-01-25 NOTE — ED PROVIDER NOTE - CCCP TRG CHIEF CMPLNT
[facility] cough

## 2024-01-25 NOTE — ED PROVIDER NOTE - OBJECTIVE STATEMENT
29F with no pmh presenting with cough, congestion, sore throat x 2 days. Coworkers recently had covid and pt requesting testing.   Denies fever, chills, ear pain, cp, sob, abdominal pain, dysuria, numbness/tingling.

## 2024-01-25 NOTE — ED PROVIDER NOTE - ATTENDING APP SHARED VISIT CONTRIBUTION OF CARE
I, Nick Mead, performed the initial face to face bedside interview with this patient regarding history of present illness, review of symptoms and relevant past medical, social and family history.  I completed an independent physical examination.  I was the initial provider who evaluated this patient. I have signed out the follow up of any pending tests (i.e. labs, radiological studies) to the ACP.  I have communicated the patient’s plan of care and disposition with the ACP.

## 2024-01-25 NOTE — ED PROVIDER NOTE - PATIENT PORTAL LINK FT
You can access the FollowMyHealth Patient Portal offered by [facility] by registering at the following website: [email or web address]. By joining [facility]’s FollowMyHealth portal, you will also be able to view your health information using other applications (apps) compatible with our system.

## 2024-01-25 NOTE — ED PROVIDER NOTE - PHYSICAL EXAMINATION
Gen: No acute distress, non toxic  HEENT: NCAT. Mucous membranes moist, uvula m/l, pink conjunctivae, EOMI  CV: RRR, nl s1/s2.  Resp: CTAB, normal rate and effort  GI: Abdomen soft, NT, ND. No rebound, no guarding  Neuro: A&O x 3, moving all 4 extremities.   MSK: No spine or joint tenderness to palpation  Skin: No rashes. intact and perfused.

## 2024-01-25 NOTE — ED ADULT NURSE NOTE - NSFALLUNIVINTERV_ED_ALL_ED
Bed/Stretcher in lowest position, wheels locked, appropriate side rails in place/Call bell, personal items and telephone in reach/Instruct patient to call for assistance before getting out of bed/chair/stretcher/Non-slip footwear applied when patient is off stretcher/[place] to call system/Physically safe environment - no spills, clutter or unnecessary equipment/Purposeful proactive rounding/Room/bathroom lighting operational, light cord in reach

## 2024-09-03 DIAGNOSIS — Z34.93 ENCOUNTER FOR SUPERVISION OF NORMAL PREGNANCY, UNSPECIFIED, THIRD TRIMESTER: ICD-10-CM

## 2024-09-03 DIAGNOSIS — O99.213 OBESITY COMPLICATING PREGNANCY, THIRD TRIMESTER: ICD-10-CM

## 2024-09-03 DIAGNOSIS — O35.9XX0 MATERNAL CARE FOR (SUSPECTED) FETAL ABNORMALITY AND DAMAGE, UNSPECIFIED, NOT APPLICABLE OR UNSPECIFIED: ICD-10-CM

## 2024-09-05 ENCOUNTER — NON-APPOINTMENT (OUTPATIENT)
Age: 29
End: 2024-09-05

## 2024-09-06 ENCOUNTER — APPOINTMENT (OUTPATIENT)
Dept: OBGYN | Facility: CLINIC | Age: 29
End: 2024-09-06
Payer: MEDICAID

## 2024-09-06 VITALS
WEIGHT: 155 LBS | SYSTOLIC BLOOD PRESSURE: 102 MMHG | DIASTOLIC BLOOD PRESSURE: 84 MMHG | HEIGHT: 56 IN | BODY MASS INDEX: 34.87 KG/M2

## 2024-09-06 DIAGNOSIS — Z01.419 ENCOUNTER FOR GYNECOLOGICAL EXAMINATION (GENERAL) (ROUTINE) W/OUT ABNORMAL FINDINGS: ICD-10-CM

## 2024-09-06 DIAGNOSIS — N91.2 AMENORRHEA, UNSPECIFIED: ICD-10-CM

## 2024-09-06 DIAGNOSIS — Z82.49 FAMILY HISTORY OF ISCHEMIC HEART DISEASE AND OTHER DISEASES OF THE CIRCULATORY SYSTEM: ICD-10-CM

## 2024-09-06 DIAGNOSIS — T83.32XA DISPLACEMENT OF INTRAUTERINE CONTRACEPTIVE DEVICE, INITIAL ENCOUNTER: ICD-10-CM

## 2024-09-06 DIAGNOSIS — Z83.3 FAMILY HISTORY OF DIABETES MELLITUS: ICD-10-CM

## 2024-09-06 LAB
HCG UR QL: POSITIVE
QUALITY CONTROL: YES

## 2024-09-06 PROCEDURE — 99212 OFFICE O/P EST SF 10 MIN: CPT | Mod: 25

## 2024-09-06 PROCEDURE — 81025 URINE PREGNANCY TEST: CPT

## 2024-09-06 PROCEDURE — 99459 PELVIC EXAMINATION: CPT

## 2024-09-06 PROCEDURE — 99395 PREV VISIT EST AGE 18-39: CPT

## 2024-09-06 NOTE — PHYSICAL EXAM
[Chaperone Present] : A chaperone was present in the examining room during all aspects of the physical examination [17207] : A chaperone was present during the pelvic exam. [Appropriately responsive] : appropriately responsive [Alert] : alert [No Acute Distress] : no acute distress [No Lymphadenopathy] : no lymphadenopathy [Regular Rate Rhythm] : regular rate rhythm [No Murmurs] : no murmurs [Clear to Auscultation B/L] : clear to auscultation bilaterally [Soft] : soft [Non-tender] : non-tender [Non-distended] : non-distended [No HSM] : No HSM [No Lesions] : no lesions [No Mass] : no mass [Oriented x3] : oriented x3 [Examination Of The Breasts] : a normal appearance [No Masses] : no breast masses were palpable [Labia Majora] : normal [Labia Minora] : normal [Normal] : normal [Uterine Adnexae] : normal [FreeTextEntry5] : IUD string not seen

## 2024-09-06 NOTE — HISTORY OF PRESENT ILLNESS
[IUD] : has an intrauterine device [Y] : Positive pregnancy history [Menarche Age: ____] : age at menarche was [unfilled] [PGHxTotal] : 5 [[facility]] : 4 [PGHxPremature] : 0 [PGHxAbortions] : 0 [[facility]] : 4 [PGHxABInduced] : 0 [PGHxABSpont] : 0 [PGHxEctopic] : 0 [PGHxMultBirths] : 0

## 2024-09-06 NOTE — HISTORY OF PRESENT ILLNESS
[IUD] : has an intrauterine device [Y] : Positive pregnancy history [Menarche Age: ____] : age at menarche was [unfilled] [PGHxTotal] : 5 [[facility]] : 4 [PGHxPremature] : 0 [PGHxAbortions] : 0 [[facility]] : 4 [PGHxABInduced] : 0 [PGHxABSpont] : 0 [PGHxEctopic] : 0 [PGHxMultBirths] : 0

## 2024-09-06 NOTE — REASON FOR VISIT
[Annual] : an annual visit. [FreeTextEntry2] : The patient had a positive pregnancy test with a Mirena IUD.

## 2024-09-06 NOTE — PHYSICAL EXAM
[Chaperone Present] : A chaperone was present in the examining room during all aspects of the physical examination [[id number]] : A chaperone was present during the pelvic exam. [Appropriately responsive] : appropriately responsive [Alert] : alert [No Acute Distress] : no acute distress [No Lymphadenopathy] : no lymphadenopathy [Regular Rate Rhythm] : regular rate rhythm [No Murmurs] : no murmurs [Clear to Auscultation B/L] : clear to auscultation bilaterally [Soft] : soft [Non-tender] : non-tender [Non-distended] : non-distended [No HSM] : No HSM [No Lesions] : no lesions [No Mass] : no mass [Oriented x3] : oriented x3 [Examination Of The Breasts] : a normal appearance [No Masses] : no breast masses were palpable [Labia Majora] : normal [Labia Minora] : normal [Normal] : normal [Uterine Adnexae] : normal [FreeTextEntry5] : IUD string not seen

## 2024-09-07 LAB
HCG SERPL-MCNC: ABNORMAL MIU/ML
PROGEST SERPL-MCNC: 15 NG/ML

## 2024-09-09 ENCOUNTER — APPOINTMENT (OUTPATIENT)
Dept: OBGYN | Facility: CLINIC | Age: 29
End: 2024-09-09
Payer: MEDICAID

## 2024-09-09 LAB
C TRACH RRNA SPEC QL NAA+PROBE: NOT DETECTED
HPV HIGH+LOW RISK DNA PNL CVX: NOT DETECTED
N GONORRHOEA RRNA SPEC QL NAA+PROBE: NOT DETECTED
SOURCE TP AMPLIFICATION: NORMAL

## 2024-09-09 PROCEDURE — 81025 URINE PREGNANCY TEST: CPT

## 2024-09-11 ENCOUNTER — ASOB RESULT (OUTPATIENT)
Age: 29
End: 2024-09-11

## 2024-09-11 ENCOUNTER — APPOINTMENT (OUTPATIENT)
Dept: ANTEPARTUM | Facility: CLINIC | Age: 29
End: 2024-09-11
Payer: MEDICAID

## 2024-09-11 LAB
CYTOLOGY CVX/VAG DOC THIN PREP: NORMAL
HCG SERPL-MCNC: ABNORMAL MIU/ML

## 2024-09-11 PROCEDURE — 76817 TRANSVAGINAL US OBSTETRIC: CPT

## 2024-09-30 ENCOUNTER — LABORATORY RESULT (OUTPATIENT)
Age: 29
End: 2024-09-30

## 2024-09-30 ENCOUNTER — APPOINTMENT (OUTPATIENT)
Dept: OBGYN | Facility: CLINIC | Age: 29
End: 2024-09-30
Payer: MEDICAID

## 2024-09-30 VITALS
HEIGHT: 56 IN | SYSTOLIC BLOOD PRESSURE: 130 MMHG | DIASTOLIC BLOOD PRESSURE: 88 MMHG | BODY MASS INDEX: 34.78 KG/M2 | WEIGHT: 154.6 LBS

## 2024-09-30 PROCEDURE — 0500F INITIAL PRENATAL CARE VISIT: CPT

## 2024-10-01 LAB
ABO + RH PNL BLD: NORMAL
ALBUMIN SERPL ELPH-MCNC: 4.3 G/DL
ALP BLD-CCNC: 114 U/L
ALT SERPL-CCNC: 20 U/L
ANION GAP SERPL CALC-SCNC: 17 MMOL/L
APPEARANCE: CLEAR
APPEARANCE: CLEAR
AST SERPL-CCNC: 17 U/L
BILIRUB SERPL-MCNC: <0.2 MG/DL
BILIRUBIN URINE: NEGATIVE
BILIRUBIN URINE: NEGATIVE
BLD GP AB SCN SERPL QL: NORMAL
BLOOD URINE: ABNORMAL
BLOOD URINE: ABNORMAL
BUN SERPL-MCNC: 9 MG/DL
CALCIUM SERPL-MCNC: 9.5 MG/DL
CHLORIDE SERPL-SCNC: 100 MMOL/L
CMV IGG SERPL QL: 0.4 U/ML
CMV IGG SERPL-IMP: NEGATIVE
CMV IGM SERPL QL: <8 AU/ML
CMV IGM SERPL QL: NEGATIVE
CO2 SERPL-SCNC: 20 MMOL/L
COLOR: YELLOW
COLOR: YELLOW
CREAT SERPL-MCNC: 0.57 MG/DL
EGFR: 125 ML/MIN/1.73M2
ESTIMATED AVERAGE GLUCOSE: 126 MG/DL
GLUCOSE QUALITATIVE U: NEGATIVE
GLUCOSE QUALITATIVE U: NEGATIVE MG/DL
GLUCOSE SERPL-MCNC: 88 MG/DL
HBA1C MFR BLD HPLC: 6 %
HBV SURFACE AG SER QL: NONREACTIVE
HCT VFR BLD CALC: 32.1 %
HCV AB SER QL: NONREACTIVE
HCV S/CO RATIO: 0.12 S/CO
HGB A MFR BLD: 97.4 %
HGB A2 MFR BLD: 2.6 %
HGB BLD-MCNC: 10.1 G/DL
HGB FRACT BLD-IMP: NORMAL
HIV1+2 AB SPEC QL IA.RAPID: NONREACTIVE
KETONES URINE: NEGATIVE
KETONES URINE: NEGATIVE MG/DL
LEUKOCYTE ESTERASE URINE: NEGATIVE
LEUKOCYTE ESTERASE URINE: NEGATIVE
MCHC RBC-ENTMCNC: 25.3 PG
MCHC RBC-ENTMCNC: 31.5 GM/DL
MCV RBC AUTO: 80.3 FL
MEV IGG FLD QL IA: 70.4 AU/ML
MEV IGG+IGM SER-IMP: POSITIVE
MUV AB SER-ACNC: POSITIVE
MUV IGG SER QL IA: 17.4 AU/ML
NITRITE URINE: NEGATIVE
NITRITE URINE: NEGATIVE
PH URINE: 6
PH URINE: 6
PLATELET # BLD AUTO: 482 K/UL
POTASSIUM SERPL-SCNC: 4.3 MMOL/L
PROT SERPL-MCNC: 7.5 G/DL
PROTEIN URINE: NEGATIVE
PROTEIN URINE: NEGATIVE MG/DL
RBC # BLD: 4 M/UL
RBC # FLD: 19.2 %
RUBV IGG FLD-ACNC: 0.93 INDEX
RUBV IGG SER-IMP: NORMAL
SODIUM SERPL-SCNC: 137 MMOL/L
SPECIFIC GRAVITY URINE: 1.01
SPECIFIC GRAVITY URINE: 1.01
T GONDII AB SER-IMP: NEGATIVE
T GONDII AB SER-IMP: NEGATIVE
T GONDII IGG SER QL: <3 IU/ML
T GONDII IGM SER QL: <3 AU/ML
T PALLIDUM AB SER QL IA: NEGATIVE
TSH SERPL-ACNC: 1.7 UIU/ML
UROBILINOGEN URINE: 0.2 (ref 0.2–?)
UROBILINOGEN URINE: 0.2 MG/DL
VZV AB TITR SER: POSITIVE
VZV IGG SER IF-ACNC: 7.59 S/CO
WBC # FLD AUTO: 9.33 K/UL

## 2024-10-02 LAB
B19V IGG SER QL IA: 1.95 INDEX
B19V IGG+IGM SER-IMP: NORMAL
B19V IGG+IGM SER-IMP: POSITIVE
B19V IGM FLD-ACNC: 0.43 INDEX
B19V IGM SER-ACNC: NEGATIVE
LEAD BLD-MCNC: <1 UG/DL

## 2024-10-05 LAB
M TB IFN-G BLD-IMP: NEGATIVE
QUANTIFERON TB PLUS MITOGEN MINUS NIL: >10 IU/ML
QUANTIFERON TB PLUS NIL: 0.02 IU/ML
QUANTIFERON TB PLUS TB1 MINUS NIL: 0.01 IU/ML
QUANTIFERON TB PLUS TB2 MINUS NIL: 0 IU/ML

## 2024-10-07 ENCOUNTER — APPOINTMENT (OUTPATIENT)
Dept: OBGYN | Facility: CLINIC | Age: 29
End: 2024-10-07
Payer: MEDICAID

## 2024-10-07 PROCEDURE — 36415 COLL VENOUS BLD VENIPUNCTURE: CPT

## 2024-10-18 ENCOUNTER — APPOINTMENT (OUTPATIENT)
Dept: ANTEPARTUM | Facility: CLINIC | Age: 29
End: 2024-10-18
Payer: MEDICAID

## 2024-10-18 ENCOUNTER — ASOB RESULT (OUTPATIENT)
Age: 29
End: 2024-10-18

## 2024-10-18 PROCEDURE — 76801 OB US < 14 WKS SINGLE FETUS: CPT

## 2024-10-21 ENCOUNTER — NON-APPOINTMENT (OUTPATIENT)
Age: 29
End: 2024-10-21

## 2024-10-30 ENCOUNTER — APPOINTMENT (OUTPATIENT)
Dept: OBGYN | Facility: CLINIC | Age: 29
End: 2024-10-30
Payer: MEDICAID

## 2024-10-30 VITALS
BODY MASS INDEX: 35.09 KG/M2 | HEIGHT: 56 IN | SYSTOLIC BLOOD PRESSURE: 100 MMHG | WEIGHT: 156 LBS | DIASTOLIC BLOOD PRESSURE: 68 MMHG

## 2024-10-30 LAB
APPEARANCE: CLEAR
BILIRUBIN URINE: NEGATIVE
BLOOD URINE: ABNORMAL
COLOR: YELLOW
GLUCOSE QUALITATIVE U: NEGATIVE
KETONES URINE: NEGATIVE
LEUKOCYTE ESTERASE URINE: NEGATIVE
NITRITE URINE: NEGATIVE
PH URINE: 6.5
PROTEIN URINE: NEGATIVE
SPECIFIC GRAVITY URINE: 1.02
UROBILINOGEN URINE: 0.2 (ref 0.2–?)

## 2024-10-30 PROCEDURE — 81003 URINALYSIS AUTO W/O SCOPE: CPT | Mod: NC,QW

## 2024-10-30 PROCEDURE — 0502F SUBSEQUENT PRENATAL CARE: CPT

## 2024-11-13 ENCOUNTER — APPOINTMENT (OUTPATIENT)
Dept: OBGYN | Facility: CLINIC | Age: 29
End: 2024-11-13
Payer: MEDICAID

## 2024-11-13 DIAGNOSIS — Z34.92 ENCOUNTER FOR SUPERVISION OF NORMAL PREGNANCY, UNSPECIFIED, SECOND TRIMESTER: ICD-10-CM

## 2024-11-13 PROCEDURE — 36415 COLL VENOUS BLD VENIPUNCTURE: CPT

## 2024-11-14 LAB
AFP INTERP SERPL-IMP: NORMAL
AFP INTERP SERPL-IMP: NORMAL
AFP MOM CUT-OFF: 2.8
AFP MOM SERPL: 1.12
AFP PERCENTILE: 63.9
AFP SERPL-ACNC: 40.2 NG/ML
CARBAMAZEPINE?: NO
CURRENT SMOKER: NO
DIABETES STATUS PATIENT: NO
GA: NORMAL
GESTATIONAL AGE METHOD: NORMAL
HX OF NTD NARR: NO
MULTIPLE PREGNANCY: NORMAL
NEURAL TUBE DEFECT RISK FETUS: NORMAL
NEURAL TUBE DEFECT RISK POP: NORMAL
RECOM F/U: NO
TEST PERFORMANCE INFO SPEC: NORMAL
VALPROIC ACID?: NO

## 2024-11-27 ENCOUNTER — APPOINTMENT (OUTPATIENT)
Dept: OBGYN | Facility: CLINIC | Age: 29
End: 2024-11-27
Payer: MEDICAID

## 2024-11-27 VITALS
SYSTOLIC BLOOD PRESSURE: 112 MMHG | DIASTOLIC BLOOD PRESSURE: 62 MMHG | BODY MASS INDEX: 34.92 KG/M2 | WEIGHT: 155.25 LBS | HEIGHT: 56 IN

## 2024-11-27 LAB
APPEARANCE: CLEAR
BILIRUBIN URINE: NEGATIVE
BLOOD URINE: ABNORMAL
COLOR: YELLOW
GLUCOSE QUALITATIVE U: NEGATIVE
KETONES URINE: NEGATIVE
LEUKOCYTE ESTERASE URINE: NEGATIVE
NITRITE URINE: NEGATIVE
PH URINE: 6
PROTEIN URINE: NEGATIVE
SPECIFIC GRAVITY URINE: 1.01
UROBILINOGEN URINE: 0.2 (ref 0.2–?)

## 2024-11-27 PROCEDURE — 0502F SUBSEQUENT PRENATAL CARE: CPT

## 2024-11-27 PROCEDURE — 81003 URINALYSIS AUTO W/O SCOPE: CPT | Mod: NC,QW

## 2024-12-10 ENCOUNTER — APPOINTMENT (OUTPATIENT)
Dept: ANTEPARTUM | Facility: CLINIC | Age: 29
End: 2024-12-10

## 2024-12-10 ENCOUNTER — ASOB RESULT (OUTPATIENT)
Age: 29
End: 2024-12-10

## 2024-12-10 PROCEDURE — 76817 TRANSVAGINAL US OBSTETRIC: CPT

## 2024-12-10 PROCEDURE — 76811 OB US DETAILED SNGL FETUS: CPT

## 2024-12-19 ENCOUNTER — APPOINTMENT (OUTPATIENT)
Dept: OBGYN | Facility: CLINIC | Age: 29
End: 2024-12-19
Payer: MEDICAID

## 2024-12-19 VITALS
DIASTOLIC BLOOD PRESSURE: 68 MMHG | HEIGHT: 56 IN | WEIGHT: 159 LBS | BODY MASS INDEX: 35.77 KG/M2 | SYSTOLIC BLOOD PRESSURE: 108 MMHG

## 2024-12-19 LAB
APPEARANCE: CLEAR
BILIRUBIN URINE: NEGATIVE
BLOOD URINE: ABNORMAL
COLOR: YELLOW
GLUCOSE QUALITATIVE U: NEGATIVE
KETONES URINE: NEGATIVE
LEUKOCYTE ESTERASE URINE: NEGATIVE
NITRITE URINE: NEGATIVE
PH URINE: 6
PROTEIN URINE: NEGATIVE
SPECIFIC GRAVITY URINE: 1.02
UROBILINOGEN URINE: 0.2 (ref 0.2–?)

## 2024-12-19 PROCEDURE — 0502F SUBSEQUENT PRENATAL CARE: CPT

## 2025-01-07 ENCOUNTER — NON-APPOINTMENT (OUTPATIENT)
Age: 30
End: 2025-01-07

## 2025-01-27 ENCOUNTER — APPOINTMENT (OUTPATIENT)
Dept: OBGYN | Facility: CLINIC | Age: 30
End: 2025-01-27
Payer: MEDICAID

## 2025-01-27 VITALS
DIASTOLIC BLOOD PRESSURE: 63 MMHG | SYSTOLIC BLOOD PRESSURE: 115 MMHG | BODY MASS INDEX: 36.22 KG/M2 | WEIGHT: 161 LBS | HEIGHT: 56 IN

## 2025-01-27 PROCEDURE — 0502F SUBSEQUENT PRENATAL CARE: CPT

## 2025-01-28 DIAGNOSIS — R73.09 OTHER ABNORMAL GLUCOSE: ICD-10-CM

## 2025-02-04 ENCOUNTER — APPOINTMENT (OUTPATIENT)
Dept: ANTEPARTUM | Facility: CLINIC | Age: 30
End: 2025-02-04
Payer: MEDICAID

## 2025-02-04 ENCOUNTER — ASOB RESULT (OUTPATIENT)
Age: 30
End: 2025-02-04

## 2025-02-04 PROCEDURE — 76816 OB US FOLLOW-UP PER FETUS: CPT

## 2025-02-11 ENCOUNTER — NON-APPOINTMENT (OUTPATIENT)
Age: 30
End: 2025-02-11

## 2025-02-12 ENCOUNTER — OUTPATIENT (OUTPATIENT)
Dept: OUTPATIENT SERVICES | Facility: HOSPITAL | Age: 30
LOS: 1 days | End: 2025-02-12
Payer: MEDICAID

## 2025-02-12 VITALS — HEART RATE: 82 BPM | SYSTOLIC BLOOD PRESSURE: 98 MMHG | DIASTOLIC BLOOD PRESSURE: 53 MMHG

## 2025-02-12 VITALS — DIASTOLIC BLOOD PRESSURE: 56 MMHG | HEART RATE: 100 BPM | SYSTOLIC BLOOD PRESSURE: 118 MMHG

## 2025-02-12 DIAGNOSIS — O26.899 OTHER SPECIFIED PREGNANCY RELATED CONDITIONS, UNSPECIFIED TRIMESTER: ICD-10-CM

## 2025-02-12 DIAGNOSIS — Z98.890 OTHER SPECIFIED POSTPROCEDURAL STATES: Chronic | ICD-10-CM

## 2025-02-12 DIAGNOSIS — Z98.891 HISTORY OF UTERINE SCAR FROM PREVIOUS SURGERY: Chronic | ICD-10-CM

## 2025-02-12 LAB
APPEARANCE UR: ABNORMAL
BACTERIA # UR AUTO: ABNORMAL /HPF
BILIRUB UR-MCNC: NEGATIVE — SIGNIFICANT CHANGE UP
CAST: 2 /LPF — SIGNIFICANT CHANGE UP (ref 0–4)
COLOR SPEC: YELLOW — SIGNIFICANT CHANGE UP
DIFF PNL FLD: NEGATIVE — SIGNIFICANT CHANGE UP
GLUCOSE UR QL: NEGATIVE MG/DL — SIGNIFICANT CHANGE UP
KETONES UR-MCNC: NEGATIVE MG/DL — SIGNIFICANT CHANGE UP
LEUKOCYTE ESTERASE UR-ACNC: NEGATIVE — SIGNIFICANT CHANGE UP
NITRITE UR-MCNC: NEGATIVE — SIGNIFICANT CHANGE UP
PH UR: 6.5 — SIGNIFICANT CHANGE UP (ref 5–8)
PROT UR-MCNC: NEGATIVE MG/DL — SIGNIFICANT CHANGE UP
RBC CASTS # UR COMP ASSIST: 2 /HPF — SIGNIFICANT CHANGE UP (ref 0–4)
SP GR SPEC: 1.01 — SIGNIFICANT CHANGE UP (ref 1–1.03)
SQUAMOUS # UR AUTO: 6 /HPF — HIGH (ref 0–5)
UROBILINOGEN FLD QL: 0.2 MG/DL — SIGNIFICANT CHANGE UP (ref 0.2–1)
WBC UR QL: 4 /HPF — SIGNIFICANT CHANGE UP (ref 0–5)

## 2025-02-12 PROCEDURE — 81001 URINALYSIS AUTO W/SCOPE: CPT

## 2025-02-12 PROCEDURE — 59025 FETAL NON-STRESS TEST: CPT | Mod: 26

## 2025-02-12 PROCEDURE — G0463: CPT

## 2025-02-12 PROCEDURE — 59025 FETAL NON-STRESS TEST: CPT

## 2025-02-12 PROCEDURE — 96360 HYDRATION IV INFUSION INIT: CPT

## 2025-02-12 PROCEDURE — 99221 1ST HOSP IP/OBS SF/LOW 40: CPT | Mod: 25,GC

## 2025-02-12 RX ORDER — SODIUM CHLORIDE 9 G/ML
1000 INJECTION, SOLUTION INTRAVENOUS ONCE
Refills: 0 | Status: COMPLETED | OUTPATIENT
Start: 2025-02-12 | End: 2025-02-12

## 2025-02-12 RX ADMIN — SODIUM CHLORIDE 1000 MILLILITER(S): 9 INJECTION, SOLUTION INTRAVENOUS at 15:02

## 2025-02-12 NOTE — OB PROVIDER TRIAGE NOTE - NSOBPROVIDERNOTE_OBGYN_ALL_OB_FT
A/P: 30y  at 29wks GA who presents to L&D for contractions; rule out dehydration, UTI,  labor.   - VSS  - FHT reassuring  - SSE: cervix visually closed, no gross pooling noted  - Bedside US pending  - IVF hydration  - UA pending    Discussed with Dr. Feliz A/P: 30y  at 29wks GA who presents to L&D for contractions; rule out dehydration, UTI,  labor.   - VSS  - FHT reassuring  - SSE: cervix visually closed, no gross pooling noted  - Bedside US: CL 3.74, cervix long & closed, GEOVANNY 17.4, BPP 8/8, breech, anterior placenta  - IVF hydration  - UA pending    Discussed with Dr. Feliz A/P: 30y  at 29wks GA who presents to L&D for contractions; rule out dehydration, UTI,  labor.   - VSS  - FHT reassuring  - SSE: cervix visually closed, no gross pooling noted  - Bedside US: CL 3.74, cervix long & closed, GEOVANNY 17.4, BPP 8/8, breech, anterior placenta  - IVF hydration  - UA wnl    Patient reports cramping improved after PO hydration. CL 3.7cm, UA wnl.  labor precautions given. Patient to continue outpatient OBGYN follow up with Dr. Feliz.    Discussed with Dr. Feliz/Mikey

## 2025-02-12 NOTE — OB PROVIDER TRIAGE NOTE - NSHPLABSRESULTS_GEN_ALL_CORE
Urinalysis Basic - ( 2025 15:27 )    Color: Yellow / Appearance: Cloudy / S.011 / pH: x  Gluc: x / Ketone: Negative mg/dL  / Bili: Negative / Urobili: 0.2 mg/dL   Blood: x / Protein: Negative mg/dL / Nitrite: Negative   Leuk Esterase: Negative / RBC: 2 /HPF / WBC 4 /HPF   Sq Epi: x / Non Sq Epi: 6 /HPF / Bacteria: Occasional /HPF

## 2025-02-12 NOTE — OB PROVIDER TRIAGE NOTE - ATTENDING COMMENTS
30y  at 29wks GA who presents to L&D for contractions, found to improve after hydration with no s/s  labor, uterine rupture or placental abruption, discharge instructions reviewed.  Impression NST: reactive

## 2025-02-12 NOTE — OB RN TRIAGE NOTE - FALL HARM RISK - UNIVERSAL INTERVENTIONS
Bed in lowest position, wheels locked, appropriate side rails in place/Call bell, personal items and telephone in reach/Instruct patient to call for assistance before getting out of bed or chair/Non-slip footwear when patient is out of bed/[place] to call system/Physically safe environment - no spills, clutter or unnecessary equipment/Purposeful Proactive Rounding/Room/bathroom lighting operational, light cord in reach

## 2025-02-12 NOTE — OB PROVIDER TRIAGE NOTE - NSHPPHYSICALEXAM_GEN_ALL_CORE
T(C): 36.7 (02-12-25 @ 14:42), Max: 36.7 (02-12-25 @ 14:42)  HR: 100 (02-12-25 @ 14:42) (100 - 100)  BP: 118/56 (02-12-25 @ 14:42) (118/56 - 118/56)  RR: 17 (02-12-25 @ 14:42) (17 - 17)    Gen: NAD, well-appearing, AAOx3   Abd: Soft, gravid  Ext: non-tender, non-edematous    SSE: cervix visually closed, no gross pooling, no vaginal bleeding     FHT: baseline 140, moderate variability, +accels, -decels   [place]: no CTX    Bedside US: pending

## 2025-02-12 NOTE — OB PROVIDER TRIAGE NOTE - HISTORY OF PRESENT ILLNESS
30y  at 29wks GA who presents to L&D for contractions. Contractions began a few days ago, presenting today due to increased intensity. Patient denies vaginal bleeding and leakage of fluid. Patient endorsing vomiting x2 today, currently not nauseous She endorses good fetal movement. Denies fevers, chills, nausea, vomiting, chest pain, SOB, dizziness and headache. No other complaints at this time.     HALEIGH: 25  LMP: 24    Prenatal course is significant for:  1. Hx of CSx4  2. Obesity (BMI 36)    POB:  G1: CS (14)  G2: CS (16)  G3: CS (20)  G4: CS (24)  PGYN: -fibroids, -ovarian cysts, denies STD hx, denies abnormal PAPs   PMH: Denies  PSH: CSx4, hx of low back surgery  SH: Denies EtOH, tobacco and illicit drug use during this pregnancy  Meds: PNVs  Allergies: NKDA

## 2025-02-13 DIAGNOSIS — E66.9 OBESITY, UNSPECIFIED: ICD-10-CM

## 2025-02-13 DIAGNOSIS — O99.213 OBESITY COMPLICATING PREGNANCY, THIRD TRIMESTER: ICD-10-CM

## 2025-02-13 DIAGNOSIS — Z3A.29 29 WEEKS GESTATION OF PREGNANCY: ICD-10-CM

## 2025-02-13 DIAGNOSIS — O21.2 LATE VOMITING OF PREGNANCY: ICD-10-CM

## 2025-02-13 DIAGNOSIS — O34.219 MATERNAL CARE FOR UNSPECIFIED TYPE SCAR FROM PREVIOUS CESAREAN DELIVERY: ICD-10-CM

## 2025-02-13 DIAGNOSIS — O47.03 FALSE LABOR BEFORE 37 COMPLETED WEEKS OF GESTATION, THIRD TRIMESTER: ICD-10-CM

## 2025-02-17 ENCOUNTER — APPOINTMENT (OUTPATIENT)
Dept: OBGYN | Facility: CLINIC | Age: 30
End: 2025-02-17
Payer: MEDICAID

## 2025-02-17 VITALS
BODY MASS INDEX: 35.99 KG/M2 | DIASTOLIC BLOOD PRESSURE: 78 MMHG | WEIGHT: 160 LBS | SYSTOLIC BLOOD PRESSURE: 126 MMHG | HEIGHT: 56 IN

## 2025-02-17 LAB
APPEARANCE: CLEAR
BILIRUBIN URINE: NEGATIVE
BLOOD URINE: ABNORMAL
COLOR: YELLOW
GLUCOSE QUALITATIVE U: NEGATIVE
KETONES URINE: NEGATIVE
LEUKOCYTE ESTERASE URINE: NEGATIVE
NITRITE URINE: NEGATIVE
PH URINE: 7
PROTEIN URINE: NEGATIVE
SPECIFIC GRAVITY URINE: 1.02
UROBILINOGEN URINE: 0.2 (ref 0.2–?)

## 2025-02-17 PROCEDURE — 81003 URINALYSIS AUTO W/O SCOPE: CPT | Mod: QW

## 2025-02-17 PROCEDURE — 0502F SUBSEQUENT PRENATAL CARE: CPT

## 2025-03-04 ENCOUNTER — NON-APPOINTMENT (OUTPATIENT)
Age: 30
End: 2025-03-04

## 2025-03-07 ENCOUNTER — EMERGENCY (EMERGENCY)
Facility: HOSPITAL | Age: 30
LOS: 1 days | Discharge: HOME | End: 2025-03-07
Attending: EMERGENCY MEDICINE
Payer: MEDICAID

## 2025-03-07 VITALS
OXYGEN SATURATION: 100 % | HEIGHT: 56 IN | TEMPERATURE: 98 F | HEART RATE: 98 BPM | WEIGHT: 160.06 LBS | SYSTOLIC BLOOD PRESSURE: 101 MMHG | DIASTOLIC BLOOD PRESSURE: 61 MMHG | RESPIRATION RATE: 18 BRPM

## 2025-03-07 DIAGNOSIS — Z98.890 OTHER SPECIFIED POSTPROCEDURAL STATES: Chronic | ICD-10-CM

## 2025-03-07 DIAGNOSIS — Z98.891 HISTORY OF UTERINE SCAR FROM PREVIOUS SURGERY: Chronic | ICD-10-CM

## 2025-03-07 LAB
ALBUMIN SERPL ELPH-MCNC: 2.8 G/DL — LOW (ref 3.3–5.2)
ALP SERPL-CCNC: 155 U/L — HIGH (ref 40–120)
ALT FLD-CCNC: 13 U/L — SIGNIFICANT CHANGE UP
ANION GAP SERPL CALC-SCNC: 12 MMOL/L — SIGNIFICANT CHANGE UP (ref 5–17)
AST SERPL-CCNC: 22 U/L — SIGNIFICANT CHANGE UP
BASOPHILS # BLD AUTO: 0.03 K/UL — SIGNIFICANT CHANGE UP (ref 0–0.2)
BASOPHILS NFR BLD AUTO: 0.4 % — SIGNIFICANT CHANGE UP (ref 0–2)
BILIRUB SERPL-MCNC: <0.2 MG/DL — LOW (ref 0.4–2)
BUN SERPL-MCNC: 4.4 MG/DL — LOW (ref 8–20)
CALCIUM SERPL-MCNC: 8.4 MG/DL — SIGNIFICANT CHANGE UP (ref 8.4–10.5)
CHLORIDE SERPL-SCNC: 104 MMOL/L — SIGNIFICANT CHANGE UP (ref 96–108)
CO2 SERPL-SCNC: 19 MMOL/L — LOW (ref 22–29)
CREAT SERPL-MCNC: 0.44 MG/DL — LOW (ref 0.5–1.3)
EGFR: 132 ML/MIN/1.73M2 — SIGNIFICANT CHANGE UP
EOSINOPHIL # BLD AUTO: 0.2 K/UL — SIGNIFICANT CHANGE UP (ref 0–0.5)
EOSINOPHIL NFR BLD AUTO: 2.4 % — SIGNIFICANT CHANGE UP (ref 0–6)
GLUCOSE SERPL-MCNC: 81 MG/DL — SIGNIFICANT CHANGE UP (ref 70–99)
HCT VFR BLD CALC: 26.2 % — LOW (ref 34.5–45)
HGB BLD-MCNC: 8.3 G/DL — LOW (ref 11.5–15.5)
IMM GRANULOCYTES # BLD AUTO: 0.04 K/UL — SIGNIFICANT CHANGE UP (ref 0–0.07)
IMM GRANULOCYTES NFR BLD AUTO: 0.5 % — SIGNIFICANT CHANGE UP (ref 0–0.9)
LYMPHOCYTES # BLD AUTO: 2.05 K/UL — SIGNIFICANT CHANGE UP (ref 1–3.3)
LYMPHOCYTES NFR BLD AUTO: 24.5 % — SIGNIFICANT CHANGE UP (ref 13–44)
MCHC RBC-ENTMCNC: 25.1 PG — LOW (ref 27–34)
MCHC RBC-ENTMCNC: 31.7 G/DL — LOW (ref 32–36)
MCV RBC AUTO: 79.2 FL — LOW (ref 80–100)
MONOCYTES # BLD AUTO: 0.59 K/UL — SIGNIFICANT CHANGE UP (ref 0–0.9)
MONOCYTES NFR BLD AUTO: 7 % — SIGNIFICANT CHANGE UP (ref 2–14)
NEUTROPHILS # BLD AUTO: 5.47 K/UL — SIGNIFICANT CHANGE UP (ref 1.8–7.4)
NEUTROPHILS NFR BLD AUTO: 65.2 % — SIGNIFICANT CHANGE UP (ref 43–77)
NRBC # BLD AUTO: 0 K/UL — SIGNIFICANT CHANGE UP (ref 0–0)
NRBC # FLD: 0 K/UL — SIGNIFICANT CHANGE UP (ref 0–0)
NRBC BLD AUTO-RTO: 0 /100 WBCS — SIGNIFICANT CHANGE UP (ref 0–0)
PLATELET # BLD AUTO: 344 K/UL — SIGNIFICANT CHANGE UP (ref 150–400)
PMV BLD: 9.7 FL — SIGNIFICANT CHANGE UP (ref 7–13)
POTASSIUM SERPL-MCNC: 3.5 MMOL/L — SIGNIFICANT CHANGE UP (ref 3.5–5.3)
POTASSIUM SERPL-SCNC: 3.5 MMOL/L — SIGNIFICANT CHANGE UP (ref 3.5–5.3)
PROT SERPL-MCNC: 6.3 G/DL — LOW (ref 6.6–8.7)
RBC # BLD: 3.31 M/UL — LOW (ref 3.8–5.2)
RBC # FLD: 15.1 % — HIGH (ref 10.3–14.5)
SODIUM SERPL-SCNC: 135 MMOL/L — SIGNIFICANT CHANGE UP (ref 135–145)
WBC # BLD: 8.38 K/UL — SIGNIFICANT CHANGE UP (ref 3.8–10.5)
WBC # FLD AUTO: 8.38 K/UL — SIGNIFICANT CHANGE UP (ref 3.8–10.5)

## 2025-03-07 PROCEDURE — 36415 COLL VENOUS BLD VENIPUNCTURE: CPT

## 2025-03-07 PROCEDURE — 80053 COMPREHEN METABOLIC PANEL: CPT

## 2025-03-07 PROCEDURE — 82962 GLUCOSE BLOOD TEST: CPT

## 2025-03-07 PROCEDURE — 99285 EMERGENCY DEPT VISIT HI MDM: CPT

## 2025-03-07 PROCEDURE — 85025 COMPLETE CBC W/AUTO DIFF WBC: CPT

## 2025-03-07 PROCEDURE — 93005 ELECTROCARDIOGRAM TRACING: CPT

## 2025-03-07 PROCEDURE — 96374 THER/PROPH/DIAG INJ IV PUSH: CPT

## 2025-03-07 PROCEDURE — 93010 ELECTROCARDIOGRAM REPORT: CPT

## 2025-03-07 PROCEDURE — 99284 EMERGENCY DEPT VISIT MOD MDM: CPT | Mod: 25

## 2025-03-07 RX ORDER — SODIUM CHLORIDE 9 G/1000ML
1000 INJECTION, SOLUTION INTRAVENOUS
Refills: 0 | Status: ACTIVE | OUTPATIENT
Start: 2025-03-07 | End: 2026-02-03

## 2025-03-07 RX ORDER — METOCLOPRAMIDE HCL 10 MG
10 TABLET ORAL ONCE
Refills: 0 | Status: COMPLETED | OUTPATIENT
Start: 2025-03-07 | End: 2025-03-07

## 2025-03-07 RX ADMIN — Medication 104 MILLIGRAM(S): at 15:45

## 2025-03-07 NOTE — ED PROVIDER NOTE - PATIENT PORTAL LINK FT
You can access the FollowMyHealth Patient Portal offered by [facility] by registering at the following website: [email or web address]. By joining [employer]’s FollowMyHealth portal, you will also be able to view your health information using other applications (apps) compatible with our system.

## 2025-03-07 NOTE — ED PROVIDER NOTE - CLINICAL SUMMARY MEDICAL DECISION MAKING FREE TEXT BOX
30-year-old female , 32 weeks pregnant no PMHx presents to ED BIBA c/o nausea/vomiting, lightheadedness and brief syncopal episode at work. Received fluids en route from EMS and states she feels better now. 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 diarrhea and vomiting possibly 2/2 viral GI illness. Plan was to hydrate and check labs but pt then began experiencing lower abdominal cramping while in ED. Called OB resident who is requesting pt be brought to L&D. EKG NSR, non-ischemic, no concerning arrythmias. Pt HD stable. CBC consistent with pt's baseline. pt brought to L&D

## 2025-03-07 NOTE — ED PROVIDER NOTE - OBJECTIVE STATEMENT
30-year-old female , 32 weeks pregnant no PMHx presents to ED BIBA c/o nausea/vomiting, lightheadedness and syncopal episode at work.  Reports last night she had slight LLQ abdominal pain and had multiple episodes of diarrhea.  Today at work was feeling lightheaded and nauseous so she sat down and ate something which helped nausea but continued to feel lightheaded she laid her head down on the desk and felt she was going to faint.  States coworker witnessed her about to faint so she helped her down to the floor where she laid down.  Reports that she remembers all events and felt better upon lying on the floor.  Received fluids en route from EMS and states she feels better now.  No abdominal pain currently.  Denies lower abdominal cramping, lower back pain, vaginal bleeding, vaginal discharge, pelvic pressure, CP, SOB, headache, dizziness, visual changes.

## 2025-03-07 NOTE — ED PROVIDER NOTE - ATTENDING APP SHARED VISIT CONTRIBUTION OF CARE
30-year-old female , 32 weeks pregnant no PMHx presents to ED BIBA c/o nausea/vomiting, lightheadedness and syncopal episode at work.  + diarrhea, nausea;   pe awake alert heent ncat neck supple cor s1s2 lung clear abd soft nontender    32 weeks; neuro nonfocal dx diarrhea

## 2025-03-07 NOTE — ED ADULT TRIAGE NOTE - TEMP AT ED ARRIVAL (C)
Pt presents to ED s/p alcohol and cocaine use. called EMS himself and found asleep on floor in store. pt states he drinks "a lot" everyday and was on a "alexander". arrives to ED awake, alert, responsive to voice. airway patent.
36.6

## 2025-03-07 NOTE — ED ADULT TRIAGE NOTE - CHIEF COMPLAINT QUOTE
Pt BIBA c/o near syncopal episode while at school.  Pt 32 weeks pregnant and had nausea and vomiting this morning.  .  Pt denies abdominal cramping or chest pain.

## 2025-03-07 NOTE — ED PROVIDER NOTE - PHYSICAL EXAMINATION
Gen: No acute distress, non toxic  HENT: NCAT, Mucous membranes moist, Oropharynx without exudates, uvula midline  Eyes: pink conjunctivae, EOMI, PERRL  CV: RRR, nl s1/s2.  Resp: CTAB, normal rate and effort  GI: Abdomen soft, +gravid. Non-tender. No rebound, no guarding  : No CVAT  Neuro: A&O x 3, sensorimotor intact without deficits   MSK: No spine or joint tenderness to palpation, Full ROM ext x 4  Skin: No rashes. intact and perfused.

## 2025-03-07 NOTE — ED ADULT NURSE NOTE - OBJECTIVE STATEMENT
pt a&ox3, vss, c/o n/v since 1300, pt also noted to have some cramping upon RN assessment, pt is 32 weeks pregnant, PA made aware pt to be taken to L&D.pt in NAD @ this time, respirations even and unlabored, meds gvn per rx, POC discussed w/ patient, will continue to reassess.

## 2025-03-07 NOTE — ED PROVIDER NOTE - PROGRESS NOTE DETAILS
Informed by PERI Serra pt began experiencing lower abdominal cramping while in ED. Called OB resident who is requesting pt be brought to L&D. EKG NSR, non-ischemic, no concerning arrythmias. pt brought to L&D

## 2025-03-11 ENCOUNTER — ASOB RESULT (OUTPATIENT)
Age: 30
End: 2025-03-11

## 2025-03-11 ENCOUNTER — APPOINTMENT (OUTPATIENT)
Dept: ANTEPARTUM | Facility: CLINIC | Age: 30
End: 2025-03-11
Payer: MEDICAID

## 2025-03-11 ENCOUNTER — APPOINTMENT (OUTPATIENT)
Dept: OBGYN | Facility: CLINIC | Age: 30
End: 2025-03-11
Payer: MEDICAID

## 2025-03-11 VITALS
DIASTOLIC BLOOD PRESSURE: 70 MMHG | BODY MASS INDEX: 36.3 KG/M2 | HEIGHT: 56 IN | WEIGHT: 161.38 LBS | SYSTOLIC BLOOD PRESSURE: 116 MMHG

## 2025-03-11 DIAGNOSIS — Z34.93 ENCOUNTER FOR SUPERVISION OF NORMAL PREGNANCY, UNSPECIFIED, THIRD TRIMESTER: ICD-10-CM

## 2025-03-11 DIAGNOSIS — Z23 ENCOUNTER FOR IMMUNIZATION: ICD-10-CM

## 2025-03-11 LAB
APPEARANCE: CLEAR
BILIRUBIN URINE: NEGATIVE
BLOOD URINE: ABNORMAL
COLOR: YELLOW
GLUCOSE QUALITATIVE U: NEGATIVE
KETONES URINE: NEGATIVE
LEUKOCYTE ESTERASE URINE: NEGATIVE
NITRITE URINE: NEGATIVE
PH URINE: 6
PROTEIN URINE: NEGATIVE
SPECIFIC GRAVITY URINE: <=1.005
UROBILINOGEN URINE: 0.2 (ref 0.2–?)

## 2025-03-11 PROCEDURE — 0502F SUBSEQUENT PRENATAL CARE: CPT

## 2025-03-11 PROCEDURE — 90715 TDAP VACCINE 7 YRS/> IM: CPT

## 2025-03-11 PROCEDURE — 90471 IMMUNIZATION ADMIN: CPT

## 2025-03-11 PROCEDURE — 81003 URINALYSIS AUTO W/O SCOPE: CPT | Mod: QW

## 2025-03-11 PROCEDURE — 76819 FETAL BIOPHYS PROFIL W/O NST: CPT

## 2025-03-11 PROCEDURE — 76816 OB US FOLLOW-UP PER FETUS: CPT

## 2025-03-11 RX ORDER — PRENATAL WITH FERROUS FUM AND FOLIC ACID 3080; 920; 120; 400; 22; 1.84; 3; 20; 10; 1; 12; 200; 27; 25; 2 [IU]/1; [IU]/1; MG/1; [IU]/1; MG/1; MG/1; MG/1; MG/1; MG/1; MG/1; UG/1; MG/1; MG/1; MG/1; MG/1
27-1 TABLET ORAL
Qty: 90 | Refills: 3 | Status: ACTIVE | COMMUNITY
Start: 2025-03-11 | End: 1900-01-01

## 2025-03-12 ENCOUNTER — NON-APPOINTMENT (OUTPATIENT)
Age: 30
End: 2025-03-12

## 2025-03-18 ENCOUNTER — NON-APPOINTMENT (OUTPATIENT)
Age: 30
End: 2025-03-18

## 2025-03-18 ENCOUNTER — APPOINTMENT (OUTPATIENT)
Dept: OBGYN | Facility: CLINIC | Age: 30
End: 2025-03-18

## 2025-03-18 VITALS
SYSTOLIC BLOOD PRESSURE: 110 MMHG | WEIGHT: 158 LBS | HEIGHT: 56 IN | BODY MASS INDEX: 35.54 KG/M2 | DIASTOLIC BLOOD PRESSURE: 70 MMHG

## 2025-03-18 LAB
APPEARANCE: CLEAR
BILIRUBIN URINE: NEGATIVE
BLOOD URINE: ABNORMAL
COLOR: ABNORMAL
GLUCOSE QUALITATIVE U: NEGATIVE
KETONES URINE: NEGATIVE
LEUKOCYTE ESTERASE URINE: NEGATIVE
NITRITE URINE: NEGATIVE
PH URINE: 6
PROTEIN URINE: NEGATIVE
SPECIFIC GRAVITY URINE: 1.01
UROBILINOGEN URINE: 2 (ref 0.2–?)

## 2025-03-18 PROCEDURE — 81003 URINALYSIS AUTO W/O SCOPE: CPT | Mod: QW

## 2025-03-18 PROCEDURE — 0502F SUBSEQUENT PRENATAL CARE: CPT

## 2025-03-25 ENCOUNTER — APPOINTMENT (OUTPATIENT)
Dept: OBGYN | Facility: CLINIC | Age: 30
End: 2025-03-25

## 2025-03-25 VITALS
BODY MASS INDEX: 36.22 KG/M2 | HEIGHT: 56 IN | DIASTOLIC BLOOD PRESSURE: 72 MMHG | SYSTOLIC BLOOD PRESSURE: 116 MMHG | WEIGHT: 161 LBS

## 2025-03-25 PROCEDURE — 0502F SUBSEQUENT PRENATAL CARE: CPT

## 2025-03-25 PROCEDURE — 81003 URINALYSIS AUTO W/O SCOPE: CPT | Mod: QW

## 2025-04-01 ENCOUNTER — APPOINTMENT (OUTPATIENT)
Dept: OBGYN | Facility: CLINIC | Age: 30
End: 2025-04-01
Payer: MEDICAID

## 2025-04-01 VITALS
DIASTOLIC BLOOD PRESSURE: 70 MMHG | WEIGHT: 162 LBS | BODY MASS INDEX: 36.44 KG/M2 | HEIGHT: 56 IN | SYSTOLIC BLOOD PRESSURE: 112 MMHG

## 2025-04-01 LAB
APPEARANCE: CLEAR
BILIRUBIN URINE: NEGATIVE
BLOOD URINE: ABNORMAL
COLOR: YELLOW
GLUCOSE QUALITATIVE U: NEGATIVE
KETONES URINE: NEGATIVE
LEUKOCYTE ESTERASE URINE: ABNORMAL
NITRITE URINE: NEGATIVE
PH URINE: 6
PROTEIN URINE: NEGATIVE
SPECIFIC GRAVITY URINE: 1.01
UROBILINOGEN URINE: 0.2 (ref 0.2–?)

## 2025-04-01 PROCEDURE — 81003 URINALYSIS AUTO W/O SCOPE: CPT | Mod: QW

## 2025-04-01 PROCEDURE — 0502F SUBSEQUENT PRENATAL CARE: CPT

## 2025-04-01 NOTE — ED ADULT TRIAGE NOTE - HEIGHT IN CM
--------------  ADMISSION REVIEW     Payor: [employer]  Subscriber #:  [id number]  Authorization Number: [id number]    Admit date: 3/29/25  Admit time: 12:24 AM       REVIEW DOCUMENTATION:  ED Provider Notes signed by Nabil Miles MD at 3/28/2025 11:07 PM       Author: Nabil Miles MD Service: Emergency Medicine Author Type: Physician    Filed: 3/28/2025 11:07 PM Date of Service: 3/28/2025  8:28 PM Status: Signed     Patient Seen in: [facility] Emergency Department    History     Chief Complaint   Patient presents with    Weakness     Stated Complaint: fallen a couple times today, no blood thinners, son reports increased weakness     HPI  This is a 80-year-old male who was symptoms started sometime mid afternoon.  He is here with his family.  The patient states that he has been kind of weak and which she describes as both legs.  He states he just feels weak he has no other specific complaints he states he was in the toilet and could not get up.  He does not think he fell and hit his head.  He says he just stood up and just could not get up it happened twice today.  He had no one-sided weakness.  Just complaint generalized weakness he has no headache he denies any neck pain denies any chest pain denies any shortness of breath denies any abdominal pain.  Denies any diarrhea dysuria he is presently on antibiotic for UTI.  He does have some interstitial lung disease he is has a history of COPD he is not on home oxygen does take CPAP at night.  Denies any blood in the stools black stools or chills.  .  Denies any fevers, chest pain or shortness of breath at this present time he does have a chronic cough he says that for many years.    Physical Exam     ED Triage Vitals [03/28/25 2015]   /83   Pulse 99   Resp 18   Temp 98.3 °F (36.8 °C)   Temp src Temporal   SpO2 (!) 88 %   O2 Device None (Room air)     Vital Signs  BP: 140/84  Pulse: 89  Resp: (!) 27  Temp: 98.3 °F (36.8 °C)  Temp src:  Temporal  MAP (mmHg): 100    Oxygen Therapy  SpO2: 99 %  O2 Device: Nasal cannula  O2 Flow Rate (L/min): 2 L/min    Physical Exam  General: .  Older gentleman.  He was noted to be hypoxic and the triage area.  The patient is in no respiratory distress  HEENT: Atraumatic, conjunctiva are not pale.  There is no icterus.  Oral mucosa Is wet.  No facial trauma.  The neck is supple.  No midline neck tenderness  LUNGS: Coarse breath sounds at the base.  CV: Cardiovascular is regular without murmurs or rubs.  ABD: The abdomen is soft nondistended nontender.  There is no rebound.  There is no guarding.  EXT: There is good pulses bilaterally.  There is no calf tenderness.  There is no rash noted.  There is no edema  NEURO: Alert and oriented x4.  Muscle strength and sensory exam is grossly normal.  And the patient is neurologically intact with no focal findings.  No obvious weakness in upper or lower extremities.  Sensory normal facies no facial asymmetry.    ED Course     Labs Reviewed   COMP METABOLIC PANEL (14) - Abnormal; Notable for the following components:       Result Value    Glucose 106 (*)     Sodium 135 (*)     Bilirubin, Total 1.7 (*)     All other components within normal limits   CBC WITH DIFFERENTIAL WITH PLATELET - Abnormal; Notable for the following components:    HCT 38.9 (*)     .3 (*)     MCH 36.1 (*)     All other components within normal limits   URINALYSIS WITH CULTURE REFLEX - Abnormal; Notable for the following components:    Ketones Urine 60 (*)     Urobilinogen Urine 4 (*)     RBC Urine 3-5 (*)     Squamous Epi. Cells Few (*)     All other components within normal limits   PROCALCITONIN - Abnormal; Notable for the following components:    Procalcitonin 0.18 (*)     All other components within normal limits   POCT GLUCOSE - Abnormal; Notable for the following components:    POC Glucose 104 (*)     All other components within normal limits   TROPONIN I HIGH SENSITIVITY - Normal    SARS-COV-2/FLU A AND B/RSV BY PCR (GENEXPERT) - Normal   STREPTOCOCCUS PNEUMONIAE AG, URINE   LEGIONELLA URINE AG SEROGRP 1   BASIC METABOLIC PANEL (8)   MAGNESIUM   CBC WITH DIFFERENTIAL WITH PLATELET   BLOOD CULTURE   BLOOD CULTURE   SPUTUM CULTURE     The patient was placed on monitors, IV was started, blood was drawn.  Complains of generalized weakness multitude of possibilities including hypoxia secondary to pneumonia, UTI dehydration electrolyte imbalance intracranial bleed    MDM   The EKG shows normal sinus rhythm.  There is no acute ST elevations or ischemic findings.  The rest of the EKG including rate rhythm axis and intervals I agree with the EKG report . The rate is 100.  The QRS duration is 68.  When compared to an old EKG from May 2024 no significant changes.    Admission disposition: 3/28/2025 10:17 PM    The patient's blood cultures were obtained, COVID, flu was negative procalcitonin was slightly elevated at 0.18 comprehensive was grossly negative troponin was negative CBC was grossly negative.    CAT scan of the head was done to rule out bleed, chest x-ray was done to rule out pneumonia.     I personally reviewed the radiographs and my individual interpretation shows    Findings of left-sided pneumonia.  No intracranial bleed or mass.    Also reviewed official report and it shows    XR CHEST AP PORTABLE  (CPT=71045)  Result Date: 3/28/2025  CONCLUSION:  There are dependent densities in lower lungs bilaterally.  This is increased on the right and could represent worsening atelectasis with superimposed pneumonia not excluded.       CT BRAIN OR HEAD (CPT=70450)  Result Date: 3/28/2025  CONCLUSION:  Stable chronic changes.  No acute disease.        I did go back and reexamined the patient had been trying to go to the washroom and he slipped and fell.  In the washroom fell onto his bottom.  When I examined him on repeat exam he did not hit his head he has no neck pain no headache.  No midline  thoracic or lumbar pain he has got normal range of motion of his hip no pain at this present time.  He actually felt much better.  The patient's case was discussed with the hospitalist, Dr. Oliver  The patient will be admitted for further evaluation treatment.  Patient was given IV antibiotics.    2 L nasal cannula his oxygen levels within normal limits.    Medical Decision Making  Disposition and Plan     Clinical Impression:  1. Community acquired pneumonia of left lung, unspecified part of lung    2. Hypoxia       Disposition:  Admit  3/28/2025 10:17 pm    Nabil Miles MD on 3/28/2025 11:07 PM      3/29/2025  Pulmonary H&P/Consult   Reason for consult: acute on chronic hypoxic resp failure     History of Present Illness: 81 y/o w/ h/o Asthma, Pulm HTN, Complex Sleep Apnea, Chronic Hypoxic Resp failure- 3L w/ activity who prseented to EDW w/ c/o falls and weakness.  He had a chest x-ray in the ED that raised concern for atelectasis vs PNA.  He was started on abx and admitted for further care.  Currently pt feels ok but still fatigued.  He denies other changes in his health.  States that in general he has not been using his O2 with activity.  He states he has been checking his sats while active and they have been WNL.  Conts to take his symbicort as prescribed.    03/29/25 0533 03/29/25 0609 03/29/25 0745 03/29/25 0928    BP: 146/89   111/82     BP Location: Right arm   Right arm     Pulse:     93 88   Resp: 20   18     Temp: 99.4 °F (37.4 °C)   98 °F (36.7 °C)     TempSrc: Oral   Oral     SpO2: 93% 95% 96% 95%     O2 Device: Nasal cannula  O2 Flow Rate (L/min): 2 L/min  Pulse Oximetry Type: Continuous    ASSESSMENT/PLAN:  Acute on Chronic Hypoxic resp failure  -due to below  -wean to baseline (3L w/ activity)  Lobar Pneumonia  -cont abx  -monitor cultures  Asthma  -advair for symbicort  -BD protocol  CSA  -CPAP w/ sleep                 Jovanny Helms  3/29/2025 11:29 AM     General Medicine H&P   Chief  Complaint   Patient presents with    Weakness     History of Present Illness: Patient is a 80 year old male with PMH including but not limited to GERD, COPD, HTN, pHTN, MAL who p/t  ED c weakness and possible PNA.      Lives c wife at home. Had fall, weak. Doesn't remember feeling LH or dizzy. Falls in past 2d. No cp. Follows [facility], says heart \"perfect\".      On 2L. No new SOB, no f/c. Does have cough from sinuses. Hoarse throat. Told to use IS.     Component Value Date     WBC 8.6 03/29/2025     HGB 14.4 03/29/2025     HCT 41.0 03/29/2025     .0 03/29/2025     CREATSERUM 0.85 03/29/2025     BUN 11 03/29/2025      03/29/2025     K 3.8 03/29/2025      03/29/2025     CO2 24.0 03/29/2025     GLU 94 03/29/2025     CA 9.4 03/29/2025     ALB 4.3 03/28/2025     ALKPHO 89 03/28/2025     BILT 1.7 03/28/2025     TP 6.9 03/28/2025     AST 31 03/28/2025     ALT 12 03/28/2025     MG 1.9 03/29/2025     PGLU 104 03/28/2025     ASSESSMENT / PLAN:   80 year old male with PMH including but not limited to GERD, COPD, HTN, pHTN, MAL who p/t  ED c weakness and possible PNA.       Weakness/falls  - unclear etiology  - PT/OT  - check echo, follows [facility], reports echo couple months ago but don't see in system   - follow orthostatics      Acute on Chronic Hypoxic resp failure   COPD  MAL  Asthma  -advair   -BD protocol   - uses 3L b/l c activity  - pulm following, apprec  - CPAP w/ sleep      PNA  - blood/sputum cx  - viral neg  - IV CTX/azitrho    # GERD  -H2B     BPH  - finasteride     # Obesity  - d/t excess calories  - encourage diet/exercise/wt loss     # Proph  - lovenox     DIspo: PMU  Darrick Treviño MD  3/29/2025  1:02 PM     3/30/2025  Pulmonary Progress Note   No events overnight, feels better, ambulating w/o O2 and maintaining his sats     130/50   (!) 140/95 139/84    BP Location: Left arm   Right arm Right arm   Pulse:   91 90 82   Resp:     18 17   Temp:     97.9 °F (36.6 °C) 98.1  °F (36.7 °C)   TempSrc:       Oral   SpO2: 91% 94% 94% 93%     O2 Device: None (Room air)  O2 Flow Rate (L/min): 2 L/min  Pulse Oximetry Type: Continuous    3/30/2025 0741      Gross per 24 hour   Intake --   Output 1650 ml   Net -1650 ml     ASSESSMENT/PLAN:  Acute on Chronic Hypoxic resp failure  -due to below  -wean to baseline (3L w/ activity), currently not needing any O2 w/ activity  Lobar Pneumonia  -cont abx  -monitor cultures  Asthma  -advair for symbicort  -BD protocol  CSA  -CPAP w/ sleep  Dispo  -improving, family concerned that pt is too weak to go home on his own and might benefit from ALYSSA, defer to primary     Jovanny Helms  3/30/2025 12:35 PM     3/31/2025  Pulmonary Progress Note   not having too much shortness of breath or cough.       03/31/25 0458 03/31/25 0504 03/31/25 0510 03/31/25 0730    BP: (!) 183/102 153/85 136/77 128/81   Pulse: 80 78 81 71   Resp: 18     18   Temp: 97.5 °F (36.4 °C)     98 °F (36.7 °C)   TempSrc: Oral     Oral   SpO2: 95% 96% 95% 93%   O2: room air     Lab 03/28/25  2043 03/29/25  0601   COVID19 Not Detected  --    INFAPCR Negative  --    INFBPCR Negative  --    RSV Negative  --    STREPPNEUMAG  --  Negative   LEGIONAG  --  Negative     ASSESSMENT AND PLAN     Acute on Chronic Hypoxic resp failure - due to pneumonia in a patient with asthma and complex sleep apnea  -supplemental oxygen prn; he's currently on room air. Patient is on 3L O2 with activity at baseline    -antibiotics as below  Pneumonia  -cont ceftriaxone (3/28-); change to cefdinir at hospital discharge   -patient already completed 3 days of azithro  -monitor cultures  Asthma - no signs of acute exacerbation   -advair in lieu of home symbicort  -BD protocol  -outpatient follow up with Dr. Helms  Complex sleep apnea  -CPAP w/ sleep  Proph  -LMWH  Dispo  -no objection to discharge from a pulmonary perspective. He can follow up in 1-2 weeks.     Sancho Chung M.D.  3/31/2025 12:08 PM         MEDICATIONS  ADMINISTERED:  Medications 03/28/25 03/29/25 03/30/25 03/31/25   azithromycin (Zithromax) 500 mg in sodium chloride 0.9% 250mL IVPB premix  Dose: 500 mg  Freq: Once Route: IV  Last Dose: 500 mg (03/28/25 2307)  Start: 03/28/25 2216 End: 03/29/25 0007   Admin Instructions:   Consider alternative antibiotic if baseline QTc >500 ms   Order specific questions:       2307 LEONARDO-New Bag   2318 LEONARDO-Handoff           azithromycin (Zithromax) tab 500 mg  Dose: 500 mg  Freq: Daily Route: OR  Start: 03/29/25 2200 End: 03/30/25 2223   Admin Instructions:   Consider alternative antibiotic if baseline QTc >500 ms   Order specific questions:        2134 JH-Given       2223 BW-Given          cefTRIAXone (Rocephin) 2 g in sodium chloride 0.9% 100 mL IVPB-ADDV  Dose: 2 g  Freq: Every 24 hours Route: IV  Last Dose: 2 g (03/30/25 2224)  Start: 03/29/25 2100 End: 03/31/25 1840   Admin Instructions:   Ceftriaxone must NOT be administered simultaneously with calcium containing IV solutions. Includes Y-site as well.  In patients other than neonates ceftriaxone and calcium containing products may administered sequentially, provided the line is flushed in between administrations.   Order specific questions:        2137 JH-New Bag       2224 BW-New Bag       1840-D/C'd       cefTRIAXone (Rocephin) 2 g in sodium chloride 0.9% 100 mL IVPB-ADDV  Dose: 2 g  Freq: Once Route: IV  Last Dose: Stopped (03/28/25 2300)  Start: 03/28/25 2219 End: 03/28/25 2300   Admin Instructions:   Ceftriaxone must NOT be administered simultaneously with calcium containing IV solutions. Includes Y-site as well.  In patients other than neonates ceftriaxone and calcium containing products may administered sequentially, provided the line is flushed in between administrations.   Order specific questions:       2230 EW-New Bag   2300 LEONARDO-Stopped           enoxaparin (Lovenox) 40 MG/0.4ML SUBQ injection 40 mg  Dose: 40 mg  Freq: Daily Route: SC  Start: 03/29/25 0930 End:  03/31/25 1840   Admin Instructions:   Only administer Enoxaparin subcutaneously into the abdomen unless directed otherwise by a physician. Alternate injection sites between the left and right anterolateral and left and right posterolateral abdominal wall.     0921 DP-Given       0834 TQ-Given       0746 SG-Given   1840-D/C'd      escitalopram (Lexapro) tab 10 mg  Dose: 10 mg  Freq: Daily Route: OR  Start: 03/29/25 0930 End: 03/31/25 1840     0921 DP-Given       0834 TQ-Given       0747 SG-Given   1840-D/C'd      famotidine (Pepcid) tab 20 mg  Dose: 20 mg  Freq: 2 times daily Route: OR  Start: 03/29/25 0245 End: 03/31/25 1840     (0245 JH)-Not Given   0921 DP-Given     2135 JH-Given       0834 TQ-Given   2223 BW-Given      0746 SG-Given   1840-D/C'd      finasteride (Proscar) tab 5 mg  Dose: 5 mg  Freq: Daily Route: OR  Start: 03/29/25 0930 End: 03/31/25 1840   Admin Instructions:   CAUTION: REPRODUCTIVE RISK. Do not crush     0921 DP-Given       0834 TQ-Given       0746 SG-Given   1840-D/C'd      fluticasone-salmeterol (Advair Diskus) 250-50 MCG/ACT inhaler 1 puff  Dose: 1 puff  Freq: 2 times daily Route: IN  Start: 03/29/25 1245 End: 03/31/25 1840   Admin Instructions:     Rinse and spit after use.  RT to administer first dose.     1644 JS-Given   2139 JH-Given      0834 TQ-Given   2224 BW-Given      0746 SG-Given   1840-D/C'd      gabapentin (Neurontin) cap 800 mg  Dose: 800 mg  Freq: Daily Route: OR  Start: 03/29/25 0930 End: 03/31/25 1840     0921 DP-Given       0834 TQ-Given       0746 SG-Given   1840-D/C'd      oxybutynin ER (Ditropan-XL) 24 hr tab 10 mg  Dose: 10 mg  Freq: Daily Route: OR  Start: 03/29/25 0930 End: 03/31/25 1840   Admin Instructions:   DO NOT CRUSH, SPLIT, OR CHEW TABLET     0921 DP-Given       0834 TQ-Given       0746 SG-Given   1840-D/C'd        sodium chloride 0.9% infusion  Rate: 125 mL/hr Dose: 125 mL/hr  Freq: Continuous Route: IV  Last Dose: 125 mL/hr (03/29/25 0548)  Start: 03/28/25  2028 End: 03/31/25 1840 2126 LEONARDO-New Bag   2300 LEONARDO-Stopped      0548 JH-New Bag        1840-D/C'd         acetaminophen (Tylenol) tab 650 mg  Dose: 650 mg  Freq: Every 6 hours PRN Route: OR  PRN Reason: moderate pain  Start: 03/29/25 0240 End: 03/31/25 1840     0546 JH-Given   2135 JH-Given      2223 BW-Given       1840-D/C'd         Vitals      Date/Time Temp Pulse Resp BP SpO2 Weight O2 Device O2 Flow Rate (L/min) [facility]    03/31/25 1215 97.9 °F (36.6 °C) 72 17 144/83 95 % -- None (Room air) --     03/31/25 0730 98 °F (36.7 °C) 71 18 128/81 93 % -- None (Room air) -- AF    03/31/25 0510 -- 81 -- 136/77 95 % -- -- -- TM    03/31/25 0504 -- 78 -- 153/85 96 % -- -- -- TM    03/31/25 0458 97.5 °F (36.4 °C) 80 18 183/102 95 % -- -- -- TM    03/31/25 0019 98 °F (36.7 °C) 80 18 152/85 94 % -- CPAP -- TM    03/30/25 2223 -- -- -- -- -- -- CPAP -- BW    03/30/25 2141 98.4 °F (36.9 °C) 79 20 130/84 96 % -- None (Room air) -- TM    03/30/25 1749 -- 89 -- 152/95 94 % -- -- -- AF    03/30/25 1746 -- 85 -- 160/83 91 % -- -- -- AF    03/30/25 1743 -- 78 -- 148/83 96 % -- -- -- AF    03/30/25 1620 97.9 °F (36.6 °C) 77 19 145/87 95 % -- None (Room air) -- AF    03/30/25 1200 98.1 °F (36.7 °C) 82 17 139/84 93 % -- None (Room air) -- AF    03/30/25 0741 97.9 °F (36.6 °C) 90 18 140/95 94 % -- None (Room air) --     03/30/25 0427 -- 91 -- -- 94 % -- None (Room air) --     03/30/25 0406 -- -- -- 130/50 91 % -- -- --     03/30/25 0401 -- 90 -- 150/89 94 % -- -- --     03/30/25 0400 98 °F (36.7 °C) 89 18 146/83 90 % -- CPAP --     03/30/25 0038 98.1 °F (36.7 °C) 87 17 129/70 94 % -- CPAP --     03/30/25 0028 -- 85 -- -- 91 % -- CPAP --      03/29/25 2027 98.9 °F (37.2 °C) 90 18 129/72 94 % -- Nasal cannula 2 L/min AW   03/29/25 1650 -- 87 -- -- -- -- -- -- AF   03/29/25 1559 -- -- -- 144/75 -- -- -- -- DP   03/29/25 1556 -- -- -- 140/67 -- -- -- --    03/29/25 1554 97.7 °F (36.5 °C) -- -- 144/77 -- -- -- --    03/29/25  1200 97.9 °F (36.6 °C) 78 18 120/62 97 % -- -- --    03/29/25 0928 -- 88 -- -- 95 % -- -- --    03/29/25 0745 98 °F (36.7 °C) 93 18 111/82 96 % -- Nasal cannula 2 L/min    03/29/25 0609 -- -- -- -- 95 % -- Nasal cannula 2 L/min    03/29/25 0533 99.4 °F (37.4 °C) -- 20 -- -- -- -- --    03/29/25 0533 -- -- -- 146/89 93 % -- -- --    03/29/25 0100 -- -- -- -- 96 % -- CPAP --    03/29/25 0030 97.7 °F (36.5 °C) 100 21 139/90 97 % 188 lb (85.3 kg) Nasal cannula 1.5 L/min    03/29/25 0004 -- -- -- -- -- -- Nasal cannula 1.5 L/min    03/29/25 0000 -- 101 24 154/96 Abnormal  99 % -- Nasal cannula 1.5 L/min    03/28/25 2315 -- 98 27 Abnormal  138/88 98 % -- Nasal cannula 1.5 L/min    03/28/25 2200 -- 89 27 Abnormal  140/84 99 % -- Nasal cannula 2 L/min    03/28/25 2145 -- 93 21 -- 99 % -- Nasal cannula 2 L/min    03/28/25 2115 -- 93 27 Abnormal  132/80 98 % -- Nasal cannula 2 L/min    03/28/25 2020 -- -- -- -- -- -- Nasal cannula 2 L/min    03/28/25 2015 98.3 °F (36.8 °C) 99 18 152/83 88 % Abnormal  192 lb (87.1 kg) None (Room air) -- CS       FOR REVIEW/APPROVAL OF INPT ADMISSION       167.64

## 2025-04-02 DIAGNOSIS — O99.019 ANEMIA COMPLICATING PREGNANCY, UNSPECIFIED TRIMESTER: ICD-10-CM

## 2025-04-02 LAB
GP B STREP DNA SPEC QL NAA+PROBE: NOT DETECTED
HCT VFR BLD CALC: 27.9 %
HCV AB SER QL: NONREACTIVE
HCV S/CO RATIO: 0.12 S/CO
HGB BLD-MCNC: 8.4 G/DL
HIV1+2 AB SPEC QL IA.RAPID: NONREACTIVE
MCHC RBC-ENTMCNC: 23.6 PG
MCHC RBC-ENTMCNC: 30.1 G/DL
MCV RBC AUTO: 78.4 FL
PLATELET # BLD AUTO: 340 K/UL
RBC # BLD: 3.56 M/UL
RBC # FLD: 15.8 %
SOURCE GBS: NORMAL
T PALLIDUM AB SER QL IA: NEGATIVE
WBC # FLD AUTO: 7.43 K/UL

## 2025-04-02 RX ORDER — FERROUS SULFATE 325(65) MG
325 (65 FE) TABLET ORAL TWICE DAILY
Qty: 60 | Refills: 3 | Status: ACTIVE | COMMUNITY
Start: 2025-04-02 | End: 1900-01-01

## 2025-04-08 ENCOUNTER — NON-APPOINTMENT (OUTPATIENT)
Age: 30
End: 2025-04-08

## 2025-04-08 ENCOUNTER — ASOB RESULT (OUTPATIENT)
Age: 30
End: 2025-04-08

## 2025-04-08 ENCOUNTER — APPOINTMENT (OUTPATIENT)
Dept: ANTEPARTUM | Facility: CLINIC | Age: 30
End: 2025-04-08
Payer: MEDICAID

## 2025-04-08 ENCOUNTER — APPOINTMENT (OUTPATIENT)
Dept: OBGYN | Facility: CLINIC | Age: 30
End: 2025-04-08
Payer: MEDICAID

## 2025-04-08 VITALS
DIASTOLIC BLOOD PRESSURE: 70 MMHG | BODY MASS INDEX: 36.67 KG/M2 | WEIGHT: 163 LBS | HEIGHT: 56 IN | SYSTOLIC BLOOD PRESSURE: 118 MMHG

## 2025-04-08 PROCEDURE — 81003 URINALYSIS AUTO W/O SCOPE: CPT | Mod: QW

## 2025-04-08 PROCEDURE — 76819 FETAL BIOPHYS PROFIL W/O NST: CPT

## 2025-04-08 PROCEDURE — 76816 OB US FOLLOW-UP PER FETUS: CPT

## 2025-04-08 PROCEDURE — 0502F SUBSEQUENT PRENATAL CARE: CPT

## 2025-04-10 ENCOUNTER — TRANSCRIPTION ENCOUNTER (OUTPATIENT)
Age: 30
End: 2025-04-10

## 2025-04-10 ENCOUNTER — INPATIENT (INPATIENT)
Facility: HOSPITAL | Age: 30
LOS: 1 days | Discharge: HOME | DRG: 833 | End: 2025-04-12
Attending: OBSTETRICS & GYNECOLOGY | Admitting: OBSTETRICS & GYNECOLOGY
Payer: MEDICAID

## 2025-04-10 ENCOUNTER — RESULT REVIEW (OUTPATIENT)
Age: 30
End: 2025-04-10

## 2025-04-10 VITALS
DIASTOLIC BLOOD PRESSURE: 60 MMHG | RESPIRATION RATE: 18 BRPM | TEMPERATURE: 99 F | SYSTOLIC BLOOD PRESSURE: 105 MMHG | HEART RATE: 131 BPM

## 2025-04-10 DIAGNOSIS — O26.899 OTHER SPECIFIED PREGNANCY RELATED CONDITIONS, UNSPECIFIED TRIMESTER: ICD-10-CM

## 2025-04-10 DIAGNOSIS — Z98.891 HISTORY OF UTERINE SCAR FROM PREVIOUS SURGERY: Chronic | ICD-10-CM

## 2025-04-10 DIAGNOSIS — Z98.890 OTHER SPECIFIED POSTPROCEDURAL STATES: Chronic | ICD-10-CM

## 2025-04-10 DIAGNOSIS — O26.893 OTHER SPECIFIED PREGNANCY RELATED CONDITIONS, THIRD TRIMESTER: ICD-10-CM

## 2025-04-10 LAB
BLD GP AB SCN SERPL QL: SIGNIFICANT CHANGE UP
HCT VFR BLD CALC: 26.8 % — LOW (ref 34.5–45)
HCT VFR BLD CALC: 30.3 % — LOW (ref 34.5–45)
HGB BLD-MCNC: 8.4 G/DL — LOW (ref 11.5–15.5)
HGB BLD-MCNC: 9.8 G/DL — LOW (ref 11.5–15.5)
MCHC RBC-ENTMCNC: 23.7 PG — LOW (ref 27–34)
MCHC RBC-ENTMCNC: 25.5 PG — LOW (ref 27–34)
MCHC RBC-ENTMCNC: 31.3 G/DL — LOW (ref 32–36)
MCHC RBC-ENTMCNC: 32.3 G/DL — SIGNIFICANT CHANGE UP (ref 32–36)
MCV RBC AUTO: 75.5 FL — LOW (ref 80–100)
MCV RBC AUTO: 78.7 FL — LOW (ref 80–100)
NRBC # BLD AUTO: 0 K/UL — SIGNIFICANT CHANGE UP (ref 0–0)
NRBC # BLD AUTO: 0 K/UL — SIGNIFICANT CHANGE UP (ref 0–0)
NRBC # FLD: 0 K/UL — SIGNIFICANT CHANGE UP (ref 0–0)
NRBC # FLD: 0 K/UL — SIGNIFICANT CHANGE UP (ref 0–0)
NRBC BLD AUTO-RTO: 0 /100 WBCS — SIGNIFICANT CHANGE UP (ref 0–0)
NRBC BLD AUTO-RTO: 0 /100 WBCS — SIGNIFICANT CHANGE UP (ref 0–0)
PLATELET # BLD AUTO: 266 K/UL — SIGNIFICANT CHANGE UP (ref 150–400)
PLATELET # BLD AUTO: 335 K/UL — SIGNIFICANT CHANGE UP (ref 150–400)
PMV BLD: 10.2 FL — SIGNIFICANT CHANGE UP (ref 7–13)
PMV BLD: 10.5 FL — SIGNIFICANT CHANGE UP (ref 7–13)
RBC # BLD: 3.55 M/UL — LOW (ref 3.8–5.2)
RBC # BLD: 3.85 M/UL — SIGNIFICANT CHANGE UP (ref 3.8–5.2)
RBC # FLD: 16.4 % — HIGH (ref 10.3–14.5)
RBC # FLD: 17.3 % — HIGH (ref 10.3–14.5)
T PALLIDUM AB TITR SER: NEGATIVE — SIGNIFICANT CHANGE UP
WBC # BLD: 10.64 K/UL — HIGH (ref 3.8–10.5)
WBC # BLD: 6.89 K/UL — SIGNIFICANT CHANGE UP (ref 3.8–10.5)
WBC # FLD AUTO: 10.64 K/UL — HIGH (ref 3.8–10.5)
WBC # FLD AUTO: 6.89 K/UL — SIGNIFICANT CHANGE UP (ref 3.8–10.5)

## 2025-04-10 PROCEDURE — 59510 CESAREAN DELIVERY: CPT | Mod: U7

## 2025-04-10 PROCEDURE — 58700 REMOVAL OF FALLOPIAN TUBE: CPT

## 2025-04-10 PROCEDURE — 88302 TISSUE EXAM BY PATHOLOGIST: CPT | Mod: 26

## 2025-04-10 RX ORDER — MAGNESIUM HYDROXIDE 400 MG/5ML
30 SUSPENSION ORAL
Refills: 0 | Status: DISCONTINUED | OUTPATIENT
Start: 2025-04-10 | End: 2025-04-12

## 2025-04-10 RX ORDER — SODIUM CHLORIDE 9 G/1000ML
1000 INJECTION, SOLUTION INTRAVENOUS ONCE
Refills: 0 | Status: DISCONTINUED | OUTPATIENT
Start: 2025-04-10 | End: 2025-04-10

## 2025-04-10 RX ORDER — IBUPROFEN 200 MG
600 TABLET ORAL EVERY 6 HOURS
Refills: 0 | Status: COMPLETED | OUTPATIENT
Start: 2025-04-10 | End: 2026-03-09

## 2025-04-10 RX ORDER — CEFAZOLIN SODIUM IN 0.9 % NACL 3 G/100 ML
2000 INTRAVENOUS SOLUTION, PIGGYBACK (ML) INTRAVENOUS ONCE
Refills: 0 | Status: COMPLETED | OUTPATIENT
Start: 2025-04-10 | End: 2025-04-10

## 2025-04-10 RX ORDER — MEASLES,MUMPS,RUBELLA LIVE VAC 20000/0.5
0.5 VIAL (EA) SUBCUTANEOUS ONCE
Refills: 0 | Status: COMPLETED | OUTPATIENT
Start: 2025-04-10 | End: 2025-04-11

## 2025-04-10 RX ORDER — CITRIC ACID/SODIUM CITRATE 300-500 MG
30 SOLUTION, ORAL ORAL ONCE
Refills: 0 | Status: COMPLETED | OUTPATIENT
Start: 2025-04-10 | End: 2025-04-10

## 2025-04-10 RX ORDER — KETOROLAC TROMETHAMINE 30 MG/ML
30 INJECTION, SOLUTION INTRAMUSCULAR; INTRAVENOUS EVERY 6 HOURS
Refills: 0 | Status: DISCONTINUED | OUTPATIENT
Start: 2025-04-10 | End: 2025-04-10

## 2025-04-10 RX ORDER — DIPHENHYDRAMINE HCL 12.5MG/5ML
25 ELIXIR ORAL EVERY 6 HOURS
Refills: 0 | Status: DISCONTINUED | OUTPATIENT
Start: 2025-04-10 | End: 2025-04-12

## 2025-04-10 RX ORDER — CEFAZOLIN SODIUM IN 0.9 % NACL 3 G/100 ML
2000 INTRAVENOUS SOLUTION, PIGGYBACK (ML) INTRAVENOUS ONCE
Refills: 0 | Status: DISCONTINUED | OUTPATIENT
Start: 2025-04-10 | End: 2025-04-10

## 2025-04-10 RX ORDER — MODIFIED LANOLIN 100 %
1 CREAM (GRAM) TOPICAL EVERY 6 HOURS
Refills: 0 | Status: DISCONTINUED | OUTPATIENT
Start: 2025-04-10 | End: 2025-04-12

## 2025-04-10 RX ORDER — ENOXAPARIN SODIUM 100 MG/ML
40 INJECTION SUBCUTANEOUS EVERY 24 HOURS
Refills: 0 | Status: DISCONTINUED | OUTPATIENT
Start: 2025-04-10 | End: 2025-04-12

## 2025-04-10 RX ORDER — OXYCODONE HYDROCHLORIDE 30 MG/1
5 TABLET ORAL ONCE
Refills: 0 | Status: DISCONTINUED | OUTPATIENT
Start: 2025-04-10 | End: 2025-04-12

## 2025-04-10 RX ORDER — INFLUENZA A VIRUS A/IDAHO/07/2018 (H1N1) ANTIGEN (MDCK CELL DERIVED, PROPIOLACTONE INACTIVATED, INFLUENZA A VIRUS A/INDIANA/08/2018 (H3N2) ANTIGEN (MDCK CELL DERIVED, PROPIOLACTONE INACTIVATED), INFLUENZA B VIRUS B/SINGAPORE/INFTT-16-0610/2016 ANTIGEN (MDCK CELL DERIVED, PROPIOLACTONE INACTIVATED), INFLUENZA B VIRUS B/IOWA/06/2017 ANTIGEN (MDCK CELL DERIVED, PROPIOLACTONE INACTIVATED) 15; 15; 15; 15 UG/.5ML; UG/.5ML; UG/.5ML; UG/.5ML
0.5 INJECTION, SUSPENSION INTRAMUSCULAR ONCE
Refills: 0 | Status: DISCONTINUED | OUTPATIENT
Start: 2025-04-10 | End: 2025-04-12

## 2025-04-10 RX ORDER — OXYTOCIN-SODIUM CHLORIDE 0.9% IV SOLN 30 UNIT/500ML 30-0.9/5 UT/ML-%
42 SOLUTION INTRAVENOUS
Qty: 30 | Refills: 0 | Status: DISCONTINUED | OUTPATIENT
Start: 2025-04-10 | End: 2025-04-12

## 2025-04-10 RX ORDER — SODIUM CHLORIDE 9 G/1000ML
1000 INJECTION, SOLUTION INTRAVENOUS
Refills: 0 | Status: DISCONTINUED | OUTPATIENT
Start: 2025-04-10 | End: 2025-04-12

## 2025-04-10 RX ORDER — OXYCODONE HYDROCHLORIDE 30 MG/1
5 TABLET ORAL
Refills: 0 | Status: DISCONTINUED | OUTPATIENT
Start: 2025-04-10 | End: 2025-04-12

## 2025-04-10 RX ORDER — ACETAMINOPHEN 500 MG/5ML
975 LIQUID (ML) ORAL
Refills: 0 | Status: DISCONTINUED | OUTPATIENT
Start: 2025-04-10 | End: 2025-04-12

## 2025-04-10 RX ORDER — AZITHROMYCIN 250 MG
500 CAPSULE ORAL ONCE
Refills: 0 | Status: COMPLETED | OUTPATIENT
Start: 2025-04-10 | End: 2025-04-10

## 2025-04-10 RX ORDER — ACETAMINOPHEN 500 MG/5ML
975 LIQUID (ML) ORAL ONCE
Refills: 0 | Status: COMPLETED | OUTPATIENT
Start: 2025-04-10 | End: 2025-04-10

## 2025-04-10 RX ORDER — SCOPOLAMINE 1 MG/3D
1 PATCH, EXTENDED RELEASE TRANSDERMAL ONCE
Refills: 0 | Status: COMPLETED | OUTPATIENT
Start: 2025-04-10 | End: 2025-04-10

## 2025-04-10 RX ORDER — TRANEXAMIC ACID 1000 MG/10
1000 AMPUL (ML) INTRAVENOUS ONCE
Refills: 0 | Status: COMPLETED | OUTPATIENT
Start: 2025-04-10 | End: 2025-04-10

## 2025-04-10 RX ORDER — SIMETHICONE 80 MG
80 TABLET,CHEWABLE ORAL EVERY 4 HOURS
Refills: 0 | Status: DISCONTINUED | OUTPATIENT
Start: 2025-04-10 | End: 2025-04-12

## 2025-04-10 RX ORDER — SODIUM CHLORIDE 9 G/1000ML
1000 INJECTION, SOLUTION INTRAVENOUS
Refills: 0 | Status: DISCONTINUED | OUTPATIENT
Start: 2025-04-10 | End: 2025-04-10

## 2025-04-10 RX ADMIN — Medication 975 MILLIGRAM(S): at 04:23

## 2025-04-10 RX ADMIN — KETOROLAC TROMETHAMINE 30 MILLIGRAM(S): 30 INJECTION, SOLUTION INTRAMUSCULAR; INTRAVENOUS at 05:50

## 2025-04-10 RX ADMIN — Medication 80 MILLIGRAM(S): at 21:02

## 2025-04-10 RX ADMIN — KETOROLAC TROMETHAMINE 30 MILLIGRAM(S): 30 INJECTION, SOLUTION INTRAMUSCULAR; INTRAVENOUS at 11:22

## 2025-04-10 RX ADMIN — Medication 220 MILLIGRAM(S): at 04:50

## 2025-04-10 RX ADMIN — Medication 250 MILLIGRAM(S): at 04:23

## 2025-04-10 RX ADMIN — Medication 30 MILLILITER(S): at 04:23

## 2025-04-10 RX ADMIN — Medication 975 MILLIGRAM(S): at 21:01

## 2025-04-10 RX ADMIN — SCOPOLAMINE 1 PATCH: 1 PATCH, EXTENDED RELEASE TRANSDERMAL at 07:08

## 2025-04-10 RX ADMIN — Medication 975 MILLIGRAM(S): at 14:56

## 2025-04-10 RX ADMIN — SCOPOLAMINE 1 PATCH: 1 PATCH, EXTENDED RELEASE TRANSDERMAL at 04:23

## 2025-04-10 RX ADMIN — KETOROLAC TROMETHAMINE 30 MILLIGRAM(S): 30 INJECTION, SOLUTION INTRAMUSCULAR; INTRAVENOUS at 17:32

## 2025-04-10 RX ADMIN — KETOROLAC TROMETHAMINE 30 MILLIGRAM(S): 30 INJECTION, SOLUTION INTRAMUSCULAR; INTRAVENOUS at 23:14

## 2025-04-10 RX ADMIN — Medication 0.2 MILLIGRAM(S): at 05:23

## 2025-04-10 RX ADMIN — ENOXAPARIN SODIUM 40 MILLIGRAM(S): 100 INJECTION SUBCUTANEOUS at 17:33

## 2025-04-10 RX ADMIN — OXYTOCIN-SODIUM CHLORIDE 0.9% IV SOLN 30 UNIT/500ML 42 MILLIUNIT(S)/MIN: 30-0.9/5 SOLUTION at 07:17

## 2025-04-10 RX ADMIN — Medication 20 MILLIGRAM(S): at 04:23

## 2025-04-10 RX ADMIN — Medication 2000 MILLIGRAM(S): at 04:43

## 2025-04-10 RX ADMIN — Medication 975 MILLIGRAM(S): at 04:57

## 2025-04-11 ENCOUNTER — NON-APPOINTMENT (OUTPATIENT)
Age: 30
End: 2025-04-11

## 2025-04-11 LAB
BASOPHILS # BLD AUTO: 0.03 K/UL — SIGNIFICANT CHANGE UP (ref 0–0.2)
BASOPHILS NFR BLD AUTO: 0.2 % — SIGNIFICANT CHANGE UP (ref 0–2)
EOSINOPHIL # BLD AUTO: 0.01 K/UL — SIGNIFICANT CHANGE UP (ref 0–0.5)
EOSINOPHIL NFR BLD AUTO: 0.1 % — SIGNIFICANT CHANGE UP (ref 0–6)
HCT VFR BLD CALC: 28.8 % — LOW (ref 34.5–45)
HGB BLD-MCNC: 9.3 G/DL — LOW (ref 11.5–15.5)
IMM GRANULOCYTES # BLD AUTO: 0.11 K/UL — HIGH (ref 0–0.07)
IMM GRANULOCYTES NFR BLD AUTO: 0.8 % — SIGNIFICANT CHANGE UP (ref 0–0.9)
LYMPHOCYTES # BLD AUTO: 0.99 K/UL — LOW (ref 1–3.3)
LYMPHOCYTES NFR BLD AUTO: 7.3 % — LOW (ref 13–44)
MCHC RBC-ENTMCNC: 25.9 PG — LOW (ref 27–34)
MCHC RBC-ENTMCNC: 32.3 G/DL — SIGNIFICANT CHANGE UP (ref 32–36)
MCV RBC AUTO: 80.2 FL — SIGNIFICANT CHANGE UP (ref 80–100)
MONOCYTES # BLD AUTO: 0.76 K/UL — SIGNIFICANT CHANGE UP (ref 0–0.9)
MONOCYTES NFR BLD AUTO: 5.6 % — SIGNIFICANT CHANGE UP (ref 2–14)
NEUTROPHILS # BLD AUTO: 11.6 K/UL — HIGH (ref 1.8–7.4)
NEUTROPHILS NFR BLD AUTO: 86 % — HIGH (ref 43–77)
NRBC # BLD AUTO: 0 K/UL — SIGNIFICANT CHANGE UP (ref 0–0)
NRBC # FLD: 0 K/UL — SIGNIFICANT CHANGE UP (ref 0–0)
NRBC BLD AUTO-RTO: 0 /100 WBCS — SIGNIFICANT CHANGE UP (ref 0–0)
PLATELET # BLD AUTO: 275 K/UL — SIGNIFICANT CHANGE UP (ref 150–400)
PMV BLD: 10.3 FL — SIGNIFICANT CHANGE UP (ref 7–13)
RBC # BLD: 3.59 M/UL — LOW (ref 3.8–5.2)
RBC # FLD: 17.4 % — HIGH (ref 10.3–14.5)
WBC # BLD: 13.5 K/UL — HIGH (ref 3.8–10.5)
WBC # FLD AUTO: 13.5 K/UL — HIGH (ref 3.8–10.5)

## 2025-04-11 RX ORDER — CEFTRIAXONE 500 MG/1
1000 INJECTION, POWDER, FOR SOLUTION INTRAMUSCULAR; INTRAVENOUS EVERY 24 HOURS
Refills: 0 | Status: DISCONTINUED | OUTPATIENT
Start: 2025-04-11 | End: 2025-04-12

## 2025-04-11 RX ORDER — IBUPROFEN 200 MG
600 TABLET ORAL EVERY 6 HOURS
Refills: 0 | Status: DISCONTINUED | OUTPATIENT
Start: 2025-04-11 | End: 2025-04-12

## 2025-04-11 RX ORDER — CEFTRIAXONE 500 MG/1
1000 INJECTION, POWDER, FOR SOLUTION INTRAMUSCULAR; INTRAVENOUS EVERY 24 HOURS
Refills: 0 | Status: DISCONTINUED | OUTPATIENT
Start: 2025-04-11 | End: 2025-04-11

## 2025-04-11 RX ADMIN — CEFTRIAXONE 1000 MILLIGRAM(S): 500 INJECTION, POWDER, FOR SOLUTION INTRAMUSCULAR; INTRAVENOUS at 23:50

## 2025-04-11 RX ADMIN — Medication 600 MILLIGRAM(S): at 05:38

## 2025-04-11 RX ADMIN — Medication 975 MILLIGRAM(S): at 21:46

## 2025-04-11 RX ADMIN — Medication 975 MILLIGRAM(S): at 09:58

## 2025-04-11 RX ADMIN — SODIUM CHLORIDE 125 MILLILITER(S): 9 INJECTION, SOLUTION INTRAVENOUS at 03:16

## 2025-04-11 RX ADMIN — MAGNESIUM HYDROXIDE 30 MILLILITER(S): 400 SUSPENSION ORAL at 21:47

## 2025-04-11 RX ADMIN — Medication 0.5 MILLILITER(S): at 17:14

## 2025-04-11 RX ADMIN — ENOXAPARIN SODIUM 40 MILLIGRAM(S): 100 INJECTION SUBCUTANEOUS at 17:14

## 2025-04-11 RX ADMIN — Medication 975 MILLIGRAM(S): at 14:24

## 2025-04-11 RX ADMIN — Medication 600 MILLIGRAM(S): at 23:15

## 2025-04-11 RX ADMIN — Medication 600 MILLIGRAM(S): at 11:41

## 2025-04-11 RX ADMIN — Medication 80 MILLIGRAM(S): at 21:47

## 2025-04-11 RX ADMIN — Medication 975 MILLIGRAM(S): at 03:16

## 2025-04-11 RX ADMIN — Medication 80 MILLIGRAM(S): at 16:56

## 2025-04-11 RX ADMIN — Medication 600 MILLIGRAM(S): at 17:14

## 2025-04-12 VITALS
HEART RATE: 88 BPM | TEMPERATURE: 98 F | DIASTOLIC BLOOD PRESSURE: 61 MMHG | RESPIRATION RATE: 18 BRPM | OXYGEN SATURATION: 99 % | SYSTOLIC BLOOD PRESSURE: 95 MMHG

## 2025-04-12 LAB
ALBUMIN SERPL ELPH-MCNC: 2.8 G/DL — LOW (ref 3.3–5.2)
ALP SERPL-CCNC: 130 U/L — HIGH (ref 40–120)
ALT FLD-CCNC: 24 U/L — SIGNIFICANT CHANGE UP
ANION GAP SERPL CALC-SCNC: 14 MMOL/L — SIGNIFICANT CHANGE UP (ref 5–17)
APPEARANCE UR: CLEAR — SIGNIFICANT CHANGE UP
AST SERPL-CCNC: 35 U/L — HIGH
BACTERIA # UR AUTO: NEGATIVE /HPF — SIGNIFICANT CHANGE UP
BILIRUB SERPL-MCNC: <0.2 MG/DL — LOW (ref 0.4–2)
BILIRUB UR-MCNC: NEGATIVE — SIGNIFICANT CHANGE UP
BUN SERPL-MCNC: 14.8 MG/DL — SIGNIFICANT CHANGE UP (ref 8–20)
CALCIUM SERPL-MCNC: 8.3 MG/DL — LOW (ref 8.4–10.5)
CHLORIDE SERPL-SCNC: 103 MMOL/L — SIGNIFICANT CHANGE UP (ref 96–108)
CO2 SERPL-SCNC: 21 MMOL/L — LOW (ref 22–29)
COLOR SPEC: YELLOW — SIGNIFICANT CHANGE UP
CREAT SERPL-MCNC: 1.82 MG/DL — HIGH (ref 0.5–1.3)
DIFF PNL FLD: ABNORMAL
EGFR: 37 ML/MIN/1.73M2 — LOW
EGFR: 37 ML/MIN/1.73M2 — LOW
GLUCOSE SERPL-MCNC: 82 MG/DL — SIGNIFICANT CHANGE UP (ref 70–99)
GLUCOSE UR QL: NEGATIVE MG/DL — SIGNIFICANT CHANGE UP
KETONES UR-MCNC: NEGATIVE MG/DL — SIGNIFICANT CHANGE UP
LEUKOCYTE ESTERASE UR-ACNC: ABNORMAL
NITRITE UR-MCNC: NEGATIVE — SIGNIFICANT CHANGE UP
PH UR: 6 — SIGNIFICANT CHANGE UP (ref 5–8)
POTASSIUM SERPL-MCNC: 4.1 MMOL/L — SIGNIFICANT CHANGE UP (ref 3.5–5.3)
POTASSIUM SERPL-SCNC: 4.1 MMOL/L — SIGNIFICANT CHANGE UP (ref 3.5–5.3)
PROT SERPL-MCNC: 5.8 G/DL — LOW (ref 6.6–8.7)
PROT UR-MCNC: NEGATIVE MG/DL — SIGNIFICANT CHANGE UP
RBC CASTS # UR COMP ASSIST: 2 /HPF — SIGNIFICANT CHANGE UP (ref 0–4)
SODIUM SERPL-SCNC: 138 MMOL/L — SIGNIFICANT CHANGE UP (ref 135–145)
SP GR SPEC: 1.01 — SIGNIFICANT CHANGE UP (ref 1–1.03)
SQUAMOUS # UR AUTO: 0 /HPF — SIGNIFICANT CHANGE UP (ref 0–5)
UROBILINOGEN FLD QL: 0.2 MG/DL — SIGNIFICANT CHANGE UP (ref 0.2–1)
WBC UR QL: 11 /HPF — HIGH (ref 0–5)

## 2025-04-12 PROCEDURE — 85027 COMPLETE CBC AUTOMATED: CPT

## 2025-04-12 PROCEDURE — 59050 FETAL MONITOR W/REPORT: CPT

## 2025-04-12 PROCEDURE — 36415 COLL VENOUS BLD VENIPUNCTURE: CPT

## 2025-04-12 PROCEDURE — 88302 TISSUE EXAM BY PATHOLOGIST: CPT

## 2025-04-12 PROCEDURE — 59025 FETAL NON-STRESS TEST: CPT

## 2025-04-12 PROCEDURE — 87186 SC STD MICRODIL/AGAR DIL: CPT

## 2025-04-12 PROCEDURE — P9016: CPT

## 2025-04-12 PROCEDURE — 87077 CULTURE AEROBIC IDENTIFY: CPT

## 2025-04-12 PROCEDURE — 86850 RBC ANTIBODY SCREEN: CPT

## 2025-04-12 PROCEDURE — 86923 COMPATIBILITY TEST ELECTRIC: CPT

## 2025-04-12 PROCEDURE — 86780 TREPONEMA PALLIDUM: CPT

## 2025-04-12 PROCEDURE — 86900 BLOOD TYPING SEROLOGIC ABO: CPT

## 2025-04-12 PROCEDURE — 90707 MMR VACCINE SC: CPT

## 2025-04-12 PROCEDURE — 80053 COMPREHEN METABOLIC PANEL: CPT

## 2025-04-12 PROCEDURE — 85025 COMPLETE CBC W/AUTO DIFF WBC: CPT

## 2025-04-12 PROCEDURE — 81001 URINALYSIS AUTO W/SCOPE: CPT

## 2025-04-12 PROCEDURE — 36430 TRANSFUSION BLD/BLD COMPNT: CPT

## 2025-04-12 PROCEDURE — 86901 BLOOD TYPING SEROLOGIC RH(D): CPT

## 2025-04-12 PROCEDURE — 87086 URINE CULTURE/COLONY COUNT: CPT

## 2025-04-12 RX ORDER — POLYETHYLENE GLYCOL 3350 17 G/17G
17 POWDER, FOR SOLUTION ORAL DAILY
Refills: 0 | Status: DISCONTINUED | OUTPATIENT
Start: 2025-04-12 | End: 2025-04-12

## 2025-04-12 RX ORDER — IBUPROFEN 200 MG
1 TABLET ORAL
Qty: 28 | Refills: 0
Start: 2025-04-12 | End: 2025-04-18

## 2025-04-12 RX ORDER — ACETAMINOPHEN 500 MG/5ML
3 LIQUID (ML) ORAL
Qty: 84 | Refills: 0
Start: 2025-04-12 | End: 2025-04-18

## 2025-04-12 RX ADMIN — Medication 80 MILLIGRAM(S): at 05:10

## 2025-04-12 RX ADMIN — Medication 975 MILLIGRAM(S): at 08:50

## 2025-04-12 RX ADMIN — Medication 600 MILLIGRAM(S): at 05:09

## 2025-04-12 RX ADMIN — Medication 80 MILLIGRAM(S): at 01:45

## 2025-04-12 RX ADMIN — Medication 975 MILLIGRAM(S): at 02:36

## 2025-04-12 RX ADMIN — Medication 975 MILLIGRAM(S): at 14:44

## 2025-04-12 RX ADMIN — ENOXAPARIN SODIUM 40 MILLIGRAM(S): 100 INJECTION SUBCUTANEOUS at 17:47

## 2025-04-12 RX ADMIN — Medication 600 MILLIGRAM(S): at 17:48

## 2025-04-12 RX ADMIN — Medication 600 MILLIGRAM(S): at 11:34

## 2025-04-14 LAB
-  AMPICILLIN: SIGNIFICANT CHANGE UP
-  CIPROFLOXACIN: SIGNIFICANT CHANGE UP
-  LEVOFLOXACIN: SIGNIFICANT CHANGE UP
-  NITROFURANTOIN: SIGNIFICANT CHANGE UP
-  TETRACYCLINE: SIGNIFICANT CHANGE UP
-  VANCOMYCIN: SIGNIFICANT CHANGE UP
CULTURE RESULTS: ABNORMAL
METHOD TYPE: SIGNIFICANT CHANGE UP
ORGANISM # SPEC MICROSCOPIC CNT: ABNORMAL
ORGANISM # SPEC MICROSCOPIC CNT: SIGNIFICANT CHANGE UP
SPECIMEN SOURCE: SIGNIFICANT CHANGE UP

## 2025-04-15 ENCOUNTER — APPOINTMENT (OUTPATIENT)
Dept: OBGYN | Facility: CLINIC | Age: 30
End: 2025-04-15

## 2025-04-16 ENCOUNTER — NON-APPOINTMENT (OUTPATIENT)
Age: 30
End: 2025-04-16

## 2025-04-16 ENCOUNTER — APPOINTMENT (OUTPATIENT)
Dept: OBGYN | Facility: CLINIC | Age: 30
End: 2025-04-16
Payer: MEDICAID

## 2025-04-16 VITALS
SYSTOLIC BLOOD PRESSURE: 125 MMHG | DIASTOLIC BLOOD PRESSURE: 70 MMHG | WEIGHT: 151 LBS | BODY MASS INDEX: 33.97 KG/M2 | HEIGHT: 56 IN

## 2025-04-16 LAB — SURGICAL PATHOLOGY STUDY: SIGNIFICANT CHANGE UP

## 2025-04-16 PROCEDURE — 0503F POSTPARTUM CARE VISIT: CPT

## 2025-04-23 ENCOUNTER — APPOINTMENT (OUTPATIENT)
Dept: OBGYN | Facility: CLINIC | Age: 30
End: 2025-04-23

## 2025-04-23 ENCOUNTER — APPOINTMENT (OUTPATIENT)
Dept: OBGYN | Facility: HOSPITAL | Age: 30
End: 2025-04-23

## 2025-04-28 ENCOUNTER — APPOINTMENT (OUTPATIENT)
Dept: OBGYN | Facility: CLINIC | Age: 30
End: 2025-04-28

## 2025-04-28 ENCOUNTER — INPATIENT (INPATIENT)
Facility: HOSPITAL | Age: 30
LOS: 1 days | Discharge: HOME | DRG: 776 | End: 2025-04-30
Attending: OBSTETRICS & GYNECOLOGY | Admitting: OBSTETRICS & GYNECOLOGY
Payer: MEDICAID

## 2025-04-28 ENCOUNTER — RESULT REVIEW (OUTPATIENT)
Age: 30
End: 2025-04-28

## 2025-04-28 VITALS — HEART RATE: 86 BPM | SYSTOLIC BLOOD PRESSURE: 120 MMHG | DIASTOLIC BLOOD PRESSURE: 77 MMHG

## 2025-04-28 DIAGNOSIS — Z98.890 OTHER SPECIFIED POSTPROCEDURAL STATES: Chronic | ICD-10-CM

## 2025-04-28 DIAGNOSIS — Z98.891 HISTORY OF UTERINE SCAR FROM PREVIOUS SURGERY: Chronic | ICD-10-CM

## 2025-04-28 DIAGNOSIS — O26.899 OTHER SPECIFIED PREGNANCY RELATED CONDITIONS, UNSPECIFIED TRIMESTER: ICD-10-CM

## 2025-04-28 LAB
ANION GAP SERPL CALC-SCNC: 16 MMOL/L — SIGNIFICANT CHANGE UP (ref 5–17)
APPEARANCE UR: CLEAR — SIGNIFICANT CHANGE UP
BACTERIA # UR AUTO: NEGATIVE /HPF — SIGNIFICANT CHANGE UP
BASOPHILS # BLD AUTO: 0.04 K/UL — SIGNIFICANT CHANGE UP (ref 0–0.2)
BASOPHILS NFR BLD AUTO: 0.5 % — SIGNIFICANT CHANGE UP (ref 0–2)
BILIRUB UR-MCNC: NEGATIVE — SIGNIFICANT CHANGE UP
BUN SERPL-MCNC: 28.5 MG/DL — HIGH (ref 8–20)
CALCIUM SERPL-MCNC: 8.5 MG/DL — SIGNIFICANT CHANGE UP (ref 8.4–10.5)
CAST: 4 /LPF — SIGNIFICANT CHANGE UP (ref 0–4)
CHLORIDE SERPL-SCNC: 107 MMOL/L — SIGNIFICANT CHANGE UP (ref 96–108)
CO2 SERPL-SCNC: 16 MMOL/L — LOW (ref 22–29)
COLOR SPEC: YELLOW — SIGNIFICANT CHANGE UP
CREAT SERPL-MCNC: 2.95 MG/DL — HIGH (ref 0.5–1.3)
DIFF PNL FLD: ABNORMAL
EGFR: 21 ML/MIN/1.73M2 — LOW
EGFR: 21 ML/MIN/1.73M2 — LOW
EOSINOPHIL # BLD AUTO: 0.32 K/UL — SIGNIFICANT CHANGE UP (ref 0–0.5)
EOSINOPHIL NFR BLD AUTO: 3.9 % — SIGNIFICANT CHANGE UP (ref 0–6)
GLUCOSE SERPL-MCNC: 79 MG/DL — SIGNIFICANT CHANGE UP (ref 70–99)
GLUCOSE UR QL: NEGATIVE MG/DL — SIGNIFICANT CHANGE UP
HCT VFR BLD CALC: 32 % — LOW (ref 34.5–45)
HGB BLD-MCNC: 10 G/DL — LOW (ref 11.5–15.5)
IMM GRANULOCYTES # BLD AUTO: 0.03 K/UL — SIGNIFICANT CHANGE UP (ref 0–0.07)
IMM GRANULOCYTES NFR BLD AUTO: 0.4 % — SIGNIFICANT CHANGE UP (ref 0–0.9)
KETONES UR-MCNC: NEGATIVE MG/DL — SIGNIFICANT CHANGE UP
LEUKOCYTE ESTERASE UR-ACNC: NEGATIVE — SIGNIFICANT CHANGE UP
LYMPHOCYTES # BLD AUTO: 1.64 K/UL — SIGNIFICANT CHANGE UP (ref 1–3.3)
LYMPHOCYTES NFR BLD AUTO: 20 % — SIGNIFICANT CHANGE UP (ref 13–44)
MCHC RBC-ENTMCNC: 24.9 PG — LOW (ref 27–34)
MCHC RBC-ENTMCNC: 31.3 G/DL — LOW (ref 32–36)
MCV RBC AUTO: 79.8 FL — LOW (ref 80–100)
MONOCYTES # BLD AUTO: 0.57 K/UL — SIGNIFICANT CHANGE UP (ref 0–0.9)
MONOCYTES NFR BLD AUTO: 7 % — SIGNIFICANT CHANGE UP (ref 2–14)
NEUTROPHILS # BLD AUTO: 5.58 K/UL — SIGNIFICANT CHANGE UP (ref 1.8–7.4)
NEUTROPHILS NFR BLD AUTO: 68.2 % — SIGNIFICANT CHANGE UP (ref 43–77)
NITRITE UR-MCNC: NEGATIVE — SIGNIFICANT CHANGE UP
NRBC # BLD AUTO: 0 K/UL — SIGNIFICANT CHANGE UP (ref 0–0)
NRBC # FLD: 0 K/UL — SIGNIFICANT CHANGE UP (ref 0–0)
NRBC BLD AUTO-RTO: 0 /100 WBCS — SIGNIFICANT CHANGE UP (ref 0–0)
PH UR: 5.5 — SIGNIFICANT CHANGE UP (ref 5–8)
PLATELET # BLD AUTO: 986 K/UL — HIGH (ref 150–400)
PMV BLD: 8.8 FL — SIGNIFICANT CHANGE UP (ref 7–13)
POTASSIUM SERPL-MCNC: 4.3 MMOL/L — SIGNIFICANT CHANGE UP (ref 3.5–5.3)
POTASSIUM SERPL-SCNC: 4.3 MMOL/L — SIGNIFICANT CHANGE UP (ref 3.5–5.3)
PROT UR-MCNC: SIGNIFICANT CHANGE UP MG/DL
RBC # BLD: 4.01 M/UL — SIGNIFICANT CHANGE UP (ref 3.8–5.2)
RBC # FLD: 18.8 % — HIGH (ref 10.3–14.5)
RBC CASTS # UR COMP ASSIST: 2 /HPF — SIGNIFICANT CHANGE UP (ref 0–4)
SODIUM SERPL-SCNC: 138 MMOL/L — SIGNIFICANT CHANGE UP (ref 135–145)
SP GR SPEC: >1.03 — HIGH (ref 1–1.03)
SQUAMOUS # UR AUTO: 1 /HPF — SIGNIFICANT CHANGE UP (ref 0–5)
UROBILINOGEN FLD QL: 0.2 MG/DL — SIGNIFICANT CHANGE UP (ref 0.2–1)
WBC # BLD: 8.18 K/UL — SIGNIFICANT CHANGE UP (ref 3.8–10.5)
WBC # FLD AUTO: 8.18 K/UL — SIGNIFICANT CHANGE UP (ref 3.8–10.5)
WBC UR QL: 1 /HPF — SIGNIFICANT CHANGE UP (ref 0–5)

## 2025-04-28 PROCEDURE — 74176 CT ABD & PELVIS W/O CONTRAST: CPT | Mod: 26

## 2025-04-28 RX ORDER — INFLUENZA A VIRUS A/IDAHO/07/2018 (H1N1) ANTIGEN (MDCK CELL DERIVED, PROPIOLACTONE INACTIVATED, INFLUENZA A VIRUS A/INDIANA/08/2018 (H3N2) ANTIGEN (MDCK CELL DERIVED, PROPIOLACTONE INACTIVATED), INFLUENZA B VIRUS B/SINGAPORE/INFTT-16-0610/2016 ANTIGEN (MDCK CELL DERIVED, PROPIOLACTONE INACTIVATED), INFLUENZA B VIRUS B/IOWA/06/2017 ANTIGEN (MDCK CELL DERIVED, PROPIOLACTONE INACTIVATED) 15; 15; 15; 15 UG/.5ML; UG/.5ML; UG/.5ML; UG/.5ML
0.5 INJECTION, SUSPENSION INTRAMUSCULAR ONCE
Refills: 0 | Status: DISCONTINUED | OUTPATIENT
Start: 2025-04-28 | End: 2025-04-30

## 2025-04-28 RX ORDER — ACETAMINOPHEN 500 MG/5ML
1000 LIQUID (ML) ORAL ONCE
Refills: 0 | Status: COMPLETED | OUTPATIENT
Start: 2025-04-28 | End: 2025-04-28

## 2025-04-28 RX ADMIN — Medication 1000 MILLIGRAM(S): at 22:05

## 2025-04-28 RX ADMIN — Medication 400 MILLIGRAM(S): at 21:45

## 2025-04-28 NOTE — OB PROVIDER TRIAGE NOTE - HISTORY OF PRESENT ILLNESS
JOSE LUIS PENN is a 31 y/o  s/p  on 4/10/25 who presents to L&D for urinary retention.     She denies LOF, VB, contractions. She endorses good fetal movement.   Pt denies headaches, visual disturbances, RUQ pain, epigastric pain and new-onset edema.   She denies fevers, chills, nausea, vomiting, shortness of breath, chest pain, and palpitations.    Pregnancy complications:    POB:  PGYN: Denies fibroids, ovarian cysts, hx STIs, abnormal pap smears   PMH:  Meds:   ALL: NKDA   PSH:  SH: Denies tobacco/EtOH/recreational drug use; feels safe at home     T(C): 36.9 (25 @ 19:34), Max: 36.9 (25 @ 19:29)  HR: 86 (25 @ 19:34) (86 - 86)  BP: 120/77 (25 @ 19:34) (120/77 - 120/77)  RR: 20 (25 @ 19:34) (20 - 20)  SpO2: --    Gen: NAD, well-appearing  Abd: soft, gravid  Ext: non-edematous, non-tender   SVE:   SSE: cervix visualized, closed and without any signs of bleeding or drainage, no pooling   FHT:   [employer]:    A/P:     Maternal VSS  FHT   [employer]:  Dispo: Continue to observe.     Discussed with   JOSE LUIS PENN is a 29 y/o  s/p  on 4/10/25 who presents to L&D for abdominal pain and urinary retention. Patient states she has had intermittent abdominal pain for 1 week. Patient states the abdominal pain was intially diffuse but is now localized to the left upper abdomen. Patient states within the last 12 hours she began to have urinary retention. Patient states she had regular urination this morning but in the afternoon she began to have the urge to urinate and could only produce small trickles of urine. Patient states she experienced similar symptoms after her post-op course in 2025 after her most recent . Patient is also endorsing mild dysuria. Patient was previously diagnosed with a UTI during her post-op course but states she was unable to get the prescribed antibiotics from the pharmacy after discharge. Patient denies fevers, chills, vomiting, shortness of breath, chest pain, and palpitations, headaches, visual disturbances, RUQ pain, epigastric pain and new-onset edema.     POB:    PGYN: Denies fibroids, ovarian cysts, hx STIs, abnormal pap smears   PMH: None  Meds: None  ALL: NKDA   PSH:  x5, Lumbar spine surgery ()  SH: Denies tobacco/EtOH/recreational drug use; feels safe at home     A/P: 29 y/o  s/p  on 4/10/25 who presents to L&D for abdominal pain and urinary retention.    Recommend labs  Recommend UA and culture; noble placed at bedside with 100cc initial output   Recommend CT Urogram    Dispo: Continue to observe.     Discussed with

## 2025-04-28 NOTE — OB POSTPARTUM TRIAGE NOTE - FALL HARM RISK - FACTORS NURSING JUDGEMENT
CAD (coronary artery disease)    Diabetic neuropathy    HLD (hyperlipidemia)    Type 2 diabetes mellitus with hyperglycemia, with long-term current use of insulin No

## 2025-04-28 NOTE — OB POSTPARTUM TRIAGE NOTE - FALL HARM RISK - UNIVERSAL INTERVENTIONS
Bed in lowest position, wheels locked, appropriate side rails in place/Call bell, personal items and telephone in reach/Instruct patient to call for assistance before getting out of bed or chair/Non-slip footwear when patient is out of bed/[place] to call system/Physically safe environment - no spills, clutter or unnecessary equipment/Purposeful Proactive Rounding/Room/bathroom lighting operational, light cord in reach

## 2025-04-28 NOTE — OB PROVIDER TRIAGE NOTE - NSHPPHYSICALEXAM_GEN_ALL_CORE
T(C): 36.9 (04-28-25 @ 19:34), Max: 36.9 (04-28-25 @ 19:29)  HR: 86 (04-28-25 @ 19:34) (86 - 86)  BP: 120/77 (04-28-25 @ 19:34) (120/77 - 120/77)  RR: 20 (04-28-25 @ 19:34) (20 - 20)  SpO2: --    Gen: NAD, well-appearing  Abd: soft, +suprapubic TTP, +distended, +well healed transverse incision with no warmth or erythema  : +noble in place  Ext: non-edematous, non-tender

## 2025-04-29 ENCOUNTER — RESULT REVIEW (OUTPATIENT)
Age: 30
End: 2025-04-29

## 2025-04-29 LAB
ALBUMIN FLD-MCNC: 1.1 G/DL — SIGNIFICANT CHANGE UP
ANION GAP SERPL CALC-SCNC: 15 MMOL/L — SIGNIFICANT CHANGE UP (ref 5–17)
B PERT IGG+IGM PNL SER: CLEAR — SIGNIFICANT CHANGE UP
BASOPHILS # BLD AUTO: 0.06 K/UL — SIGNIFICANT CHANGE UP (ref 0–0.2)
BASOPHILS %.: 2 % — SIGNIFICANT CHANGE UP
BASOPHILS NFR BLD AUTO: 0.8 % — SIGNIFICANT CHANGE UP (ref 0–2)
BLD GP AB SCN SERPL QL: SIGNIFICANT CHANGE UP
BUN SERPL-MCNC: 22.3 MG/DL — HIGH (ref 8–20)
CALCIUM SERPL-MCNC: 8.7 MG/DL — SIGNIFICANT CHANGE UP (ref 8.4–10.5)
CHLORIDE SERPL-SCNC: 105 MMOL/L — SIGNIFICANT CHANGE UP (ref 96–108)
CO2 SERPL-SCNC: 16 MMOL/L — LOW (ref 22–29)
COLOR FLD: YELLOW — SIGNIFICANT CHANGE UP
CREAT SERPL-MCNC: 1.35 MG/DL — HIGH (ref 0.5–1.3)
CULTURE RESULTS: NO GROWTH — SIGNIFICANT CHANGE UP
EGFR: 54 ML/MIN/1.73M2 — LOW
EGFR: 54 ML/MIN/1.73M2 — LOW
EOSINOPHIL # BLD AUTO: 0.51 K/UL — HIGH (ref 0–0.5)
EOSINOPHIL # FLD: 1 % — SIGNIFICANT CHANGE UP
EOSINOPHIL NFR BLD AUTO: 6.9 % — HIGH (ref 0–6)
GLUCOSE SERPL-MCNC: 79 MG/DL — SIGNIFICANT CHANGE UP (ref 70–99)
GRAM STN FLD: SIGNIFICANT CHANGE UP
HCT VFR BLD CALC: 32 % — LOW (ref 34.5–45)
HGB BLD-MCNC: 10.1 G/DL — LOW (ref 11.5–15.5)
IMM GRANULOCYTES # BLD AUTO: 0.02 K/UL — SIGNIFICANT CHANGE UP (ref 0–0.07)
IMM GRANULOCYTES NFR BLD AUTO: 0.3 % — SIGNIFICANT CHANGE UP (ref 0–0.9)
LDH SERPL L TO P-CCNC: 98 U/L — SIGNIFICANT CHANGE UP
LYMPHOCYTES # BLD AUTO: 1.43 K/UL — SIGNIFICANT CHANGE UP (ref 1–3.3)
LYMPHOCYTES # FLD: 66 % — SIGNIFICANT CHANGE UP
LYMPHOCYTES NFR BLD AUTO: 19.5 % — SIGNIFICANT CHANGE UP (ref 13–44)
MCHC RBC-ENTMCNC: 25.1 PG — LOW (ref 27–34)
MCHC RBC-ENTMCNC: 31.6 G/DL — LOW (ref 32–36)
MCV RBC AUTO: 79.4 FL — LOW (ref 80–100)
MONOCYTES # BLD AUTO: 0.6 K/UL — SIGNIFICANT CHANGE UP (ref 0–0.9)
MONOCYTES NFR BLD AUTO: 8.2 % — SIGNIFICANT CHANGE UP (ref 2–14)
MONOS+MACROS # FLD: 24 % — SIGNIFICANT CHANGE UP
NEUTROPHILS # BLD AUTO: 4.73 K/UL — SIGNIFICANT CHANGE UP (ref 1.8–7.4)
NEUTROPHILS #: 7 CELLS/UL — SIGNIFICANT CHANGE UP
NEUTROPHILS %.: 7 % — SIGNIFICANT CHANGE UP
NEUTROPHILS NFR BLD AUTO: 64.3 % — SIGNIFICANT CHANGE UP (ref 43–77)
NRBC # BLD AUTO: 0 K/UL — SIGNIFICANT CHANGE UP (ref 0–0)
NRBC # FLD: 0 K/UL — SIGNIFICANT CHANGE UP (ref 0–0)
NRBC BLD AUTO-RTO: 0 /100 WBCS — SIGNIFICANT CHANGE UP (ref 0–0)
PLATELET # BLD AUTO: 852 K/UL — HIGH (ref 150–400)
PMV BLD: 8.6 FL — SIGNIFICANT CHANGE UP (ref 7–13)
POTASSIUM SERPL-MCNC: 3.9 MMOL/L — SIGNIFICANT CHANGE UP (ref 3.5–5.3)
POTASSIUM SERPL-SCNC: 3.9 MMOL/L — SIGNIFICANT CHANGE UP (ref 3.5–5.3)
PROT FLD-MCNC: 2 G/DL — SIGNIFICANT CHANGE UP
RBC # BLD: 4.03 M/UL — SIGNIFICANT CHANGE UP (ref 3.8–5.2)
RBC # FLD: 19 % — HIGH (ref 10.3–14.5)
RCV VOL RI: < 2000 CELLS/UL — SIGNIFICANT CHANGE UP
SODIUM SERPL-SCNC: 136 MMOL/L — SIGNIFICANT CHANGE UP (ref 135–145)
SPECIMEN SOURCE: SIGNIFICANT CHANGE UP
SPECIMEN SOURCE: SIGNIFICANT CHANGE UP
TOTAL NUCLEATED CELL COUNT: 105 CELLS/UL — SIGNIFICANT CHANGE UP
TUBE TYPE: SIGNIFICANT CHANGE UP
WBC # BLD: 7.35 K/UL — SIGNIFICANT CHANGE UP (ref 3.8–10.5)
WBC # FLD AUTO: 7.35 K/UL — SIGNIFICANT CHANGE UP (ref 3.8–10.5)
WBC COUNT.: 102 CELLS/UL — SIGNIFICANT CHANGE UP

## 2025-04-29 PROCEDURE — 88305 TISSUE EXAM BY PATHOLOGIST: CPT | Mod: 26

## 2025-04-29 PROCEDURE — 49083 ABD PARACENTESIS W/IMAGING: CPT

## 2025-04-29 PROCEDURE — 88112 CYTOPATH CELL ENHANCE TECH: CPT | Mod: 26

## 2025-04-29 RX ORDER — IBUPROFEN 200 MG
600 TABLET ORAL EVERY 6 HOURS
Refills: 0 | Status: DISCONTINUED | OUTPATIENT
Start: 2025-04-29 | End: 2025-04-30

## 2025-04-29 RX ORDER — ACETAMINOPHEN 500 MG/5ML
975 LIQUID (ML) ORAL EVERY 6 HOURS
Refills: 0 | Status: DISCONTINUED | OUTPATIENT
Start: 2025-04-29 | End: 2025-04-30

## 2025-04-29 RX ADMIN — Medication 975 MILLIGRAM(S): at 23:01

## 2025-04-29 NOTE — DISCHARGE NOTE OB - PATIENT PORTAL LINK FT
You can access the FollowMyHealth Patient Portal offered by [facility] by registering at the following website: [email or web address]. By joining [employer]’s FollowMyHealth portal, you will also be able to view your health information using other applications (apps) compatible with our system.

## 2025-04-29 NOTE — H&P ADULT - NSVTERISKASSESS_GEN_ALL_CORE FT
OBPostPartum Assessment Completed on: 29-Apr-2025 05:41
OBPostPartum Assessment Completed on: 29-Apr-2025 05:41

## 2025-04-29 NOTE — H&P ADULT - ASSESSMENT
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    - Vital signs stable, afebrile, normotensive, normocardic  - WBC 8.18 within normal limits   - Cr 2.95 likely due to resorption from large urinoma  - IR consult for drainage of urinoma. NPO.  - Urology consult for evaluation bladder dome defect, pending recs. Cuellar in place. Discussed with patient conservative management with bladder compression to see if defect able to heal on its own vs. bladder decompression followed by interval repair.  - Ordered for AM labs.    D/w Dr. Feliz
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    - Vital signs stable, afebrile, normotensive, normocardic  - WBC 8.18 within normal limits   - Cr 2.95 likely due to resorption from large urinoma  - IR consult for drainage of urinoma. NPO.  - Urology consult for evaluation bladder dome defect, pending recs. Cuellar in place. Discussed with patient conservative management with bladder compression to see if defect able to heal on its own vs. bladder decompression followed by interval repair.  - Ordered for AM labs.    D/w Dr. Feliz

## 2025-04-29 NOTE — PROCEDURE NOTE - NSPROCNAME_GEN_A_CORE
Called and notified patient that an order has been placed for an ultrasound of her right breast.  Patient requested the order sent to [facility], order faxed.   Paracentesis

## 2025-04-29 NOTE — H&P ADULT - NSHPPHYSICALEXAM_GEN_ALL_CORE
Vital Signs Last 24 Hrs  T(C): 36.8 (29 Apr 2025 04:00), Max: 37 (29 Apr 2025 02:14)  T(F): 98.2 (29 Apr 2025 04:00), Max: 98.6 (29 Apr 2025 02:14)  HR: 64 (29 Apr 2025 04:00) (64 - 86)  BP: 114/68 (29 Apr 2025 04:00) (110/72 - 120/77)  BP(mean): --  RR: 18 (29 Apr 2025 04:00) (18 - 20)  SpO2: 100% (29 Apr 2025 04:00) (98% - 100%)    Parameters below as of 29 Apr 2025 02:14  Patient On (Oxygen Delivery Method): room air           04-28-25 @ 07:01  -  04-29-25 @ 05:36  --------------------------------------------------------  IN: 325 mL / OUT: 500 mL / NET: -175 mL        PHYSICAL EXAM:  CHEST/LUNG: Resting comfortably on room air  Bedside bladder scanner >2L. Bedside US concerning for free pelvic fluid  ABDOMEN: Soft, Nontender, Markedly softly distended. Incision CDI healing well
Vital Signs Last 24 Hrs  T(C): 36.8 (29 Apr 2025 04:00), Max: 37 (29 Apr 2025 02:14)  T(F): 98.2 (29 Apr 2025 04:00), Max: 98.6 (29 Apr 2025 02:14)  HR: 64 (29 Apr 2025 04:00) (64 - 86)  BP: 114/68 (29 Apr 2025 04:00) (110/72 - 120/77)  BP(mean): --  RR: 18 (29 Apr 2025 04:00) (18 - 20)  SpO2: 100% (29 Apr 2025 04:00) (98% - 100%)    Parameters below as of 29 Apr 2025 02:14  Patient On (Oxygen Delivery Method): room air           04-28-25 @ 07:01  -  04-29-25 @ 05:36  --------------------------------------------------------  IN: 325 mL / OUT: 500 mL / NET: -175 mL        PHYSICAL EXAM:  CHEST/LUNG: Resting comfortably on room air  Bedside bladder scanner >2L. Bedside US concerning for free pelvic fluid  ABDOMEN: Soft, Nontender, Markedly softly distended. Incision CDI healing well

## 2025-04-29 NOTE — CHART NOTE - NSCHARTNOTEFT_GEN_A_CORE
Urology f/u:    Patient seen and examined at bedside, awake, alert, responsive resting comfortable in no acute distress.  Vital Signs Last 24 Hrs  T(C): 37 (2025 08:00), Max: 37 (2025 02:14)  T(F): 98.6 (2025 08:00), Max: 98.6 (2025 02:14)  HR: 76 (2025 08:00) (64 - 86)  BP: 112/74 (2025 08:00) (110/72 - 120/77)  BP(mean): --  RR: 18 (2025 08:00) (18 - 20)  SpO2: 100% (2025 08:00) (98% - 100%)    Parameters below as of 2025 02:14  Patient On (Oxygen Delivery Method): room         abdomen:  soft, mildly distended  surgical scar healing ( 4/10/24 )   : noble catheter in place and draining     I&O's Detail    2025 07:01  -  2025 07:00    OUT:    Indwelling Catheter - Urethral (mL): 625 mL  Total OUT: 625 mL    Impression:  large fluid collection in peritoneum ( possible urinoma ) discussed with GYN/ IR  present situation and recommendations   Plan:  for IR drainage fluid collection in peritoneum today . Continue general care and management as per GYN we will continue to follow.

## 2025-04-29 NOTE — H&P ADULT - NSHPLABSRESULTS_GEN_ALL_CORE
10.0   8.18  )-----------( 986      ( 2025 21:00 )             32.0         138  |  107  |  28.5[H]  ----------------------------<  79  4.3   |  16.0[L]  |  2.95[H]    Ca    8.5      2025 21:00      Urinalysis Basic - ( 2025 21:00 )    Color: Yellow / Appearance: Clear / SG: >1.030 / pH: x  Gluc: 79 mg/dL / Ketone: Negative mg/dL  / Bili: Negative / Urobili: 0.2 mg/dL   Blood: x / Protein: Trace mg/dL / Nitrite: Negative   Leuk Esterase: Negative / RBC: 2 /HPF / WBC 1 /HPF   Sq Epi: x / Non Sq Epi: 1 /HPF / Bacteria: Negative /HPF          RADIOLOGY STUDIES:  < from: CT Abdomen and Pelvis No Cont (25 @ 22:52) >    INTERPRETATION:  CLINICAL INFORMATION: Abdominal pain and urinary   retention. Status post  on 4/10/2025.    COMPARISON: None.    CONTRAST/COMPLICATIONS:  IV Contrast: NONE  25 cc administered   25 cc discarded  Oral Contrast: Omnipaque 350  .    PROCEDURE:  CT of the abdomen and Pelvis was performed (CT Cystogram).  Precontrast images were obtained through the abdomen and pelvis followed   by imaging after the instillation of a dilute mixture of [id number] via   a Cuellar catheter.  Sagittal and coronal reformats were performed.    FINDINGS:  LOWER CHEST: Within normal limits.    LIVER: Within normal limits.  BILE DUCTS: Normal caliber.  GALLBLADDER: Within normal limits.  SPLEEN: Within normal limits.  PANCREAS: Within normal limits.  ADRENALS: Within normal limits.  KIDNEYS/URETERS: Within normal limits.    BLADDER: Cuellar catheter is present in the bladder lumen. The bladder   opacifies with contrast. There is extraluminal extension of contrast from   the bladder dome (8:62) with a large volume of ascites, presumably   urinoma.  REPRODUCTIVE ORGANS: The uterus and adnexa are within normal limits.    BOWEL: No bowel obstruction.  PERITONEUM: Large volume ascites, presumably urine. Small volume   pneumoperitoneum.  VESSELS:  Within normal limits.  RETROPERITONEUM: No lymphadenopathy.  ABDOMINAL WALL: Small volume of urine and contrast is seen tracking into   thelower ventral abdominal wall (8:61 and 7:123).  BONES: T10-L2 posterior spinal fusion.    IMPRESSION:  Injury to the bladder dome with a large volume urinoma.      Findings were discussed with Dr. TUSHAR BRADSHAW [phone] 2025 11:29 PM   by Dr. Augustine with read back confirmation.    < end of copied text >
LABS:                        10.0   8.18  )-----------( 986      ( 2025 21:00 )             32.0         138  |  107  |  28.5[H]  ----------------------------<  79  4.3   |  16.0[L]  |  2.95[H]    Ca    8.5      2025 21:00      Urinalysis Basic - ( 2025 21:00 )    Color: Yellow / Appearance: Clear / SG: >1.030 / pH: x  Gluc: 79 mg/dL / Ketone: Negative mg/dL  / Bili: Negative / Urobili: 0.2 mg/dL   Blood: x / Protein: Trace mg/dL / Nitrite: Negative   Leuk Esterase: Negative / RBC: 2 /HPF / WBC 1 /HPF   Sq Epi: x / Non Sq Epi: 1 /HPF / Bacteria: Negative /HPF          RADIOLOGY STUDIES:  < from: CT Abdomen and Pelvis No Cont (25 @ 22:52) >    INTERPRETATION:  CLINICAL INFORMATION: Abdominal pain and urinary   retention. Status post  on 4/10/2025.    COMPARISON: None.    CONTRAST/COMPLICATIONS:  IV Contrast: NONE  25 cc administered   25 cc discarded  Oral Contrast: Omnipaque 350  .    PROCEDURE:  CT of the abdomen and Pelvis was performed (CT Cystogram).  Precontrast images were obtained through the abdomen and pelvis followed   by imaging after the instillation of a dilute mixture of [id number] via   a Cuellar catheter.  Sagittal and coronal reformats were performed.    FINDINGS:  LOWER CHEST: Within normal limits.    LIVER: Within normal limits.  BILE DUCTS: Normal caliber.  GALLBLADDER: Within normal limits.  SPLEEN: Within normal limits.  PANCREAS: Within normal limits.  ADRENALS: Within normal limits.  KIDNEYS/URETERS: Within normal limits.    BLADDER: Cuellar catheter is present in the bladder lumen. The bladder   opacifies with contrast. There is extraluminal extension of contrast from   the bladder dome (8:62) with a large volume of ascites, presumably   urinoma.  REPRODUCTIVE ORGANS: The uterus and adnexa are within normal limits.    BOWEL: No bowel obstruction.  PERITONEUM: Large volume ascites, presumably urine. Small volume   pneumoperitoneum.  VESSELS:  Within normal limits.  RETROPERITONEUM: No lymphadenopathy.  ABDOMINAL WALL: Small volume of urine and contrast is seen tracking into   thelower ventral abdominal wall (8:61 and 7:123).  BONES: T10-L2 posterior spinal fusion.    IMPRESSION:  Injury to the bladder dome with a large volume urinoma.      Findings were discussed with Dr. TUSHAR BRADSHAW [phone] 2025 11:29 PM   by Dr. Augustine with read back confirmation.    < end of copied text >

## 2025-04-29 NOTE — DISCHARGE NOTE OB - FINANCIAL ASSISTANCE
[facility] provides services at a reduced cost to those who are determined to be eligible through [facility]’s financial assistance program. Information regarding [facility]’s financial assistance program can be found by going to [email or web address] or by calling [phone].

## 2025-04-29 NOTE — PROCEDURE NOTE - DRAINAGE DEVICE
no organomegaly/soft/bowel sounds normal/nontender/no distention/no masses palpable/no bruit/no rebound tenderness/no rigidity/no guarding 6F Safe-T-Centesis

## 2025-04-29 NOTE — CONSULT NOTE ADULT - SUBJECTIVE AND OBJECTIVE BOX
HPI as per OB: JOSE LUIS PENN is a 31 y/o  s/p  on 4/10/25 who presents to L&D for abdominal pain and urinary retention. Patient states she has had intermittent abdominal pain for 1 week. Patient states the abdominal pain was intially diffuse but is now localized to the left upper abdomen. Patient states within the last 12 hours she began to have urinary retention. Patient states she had regular urination this morning but in the afternoon she began to have the urge to urinate and could only produce small trickles of urine. Patient states she experienced similar symptoms after her post-op course in 2025 after her most recent . Patient is also endorsing mild dysuria. Patient was previously diagnosed with a UTI during her post-op course but states she was unable to get the prescribed antibiotics from the pharmacy after discharge. Patient denies fevers, chills, vomiting, shortness of breath, chest pain, and palpitations, headaches, visual disturbances, RUQ pain, epigastric pain and new-onset edema     Urology consulted for a CT findings of a urinoma. Patient is POD#19 s/p , came with with urinary retention. Patient got a CT urogram which showed bladder dome injury with a large urinoma. Patient denies any pain, fever or chills, states her belly has been this way since day 1 post-op. Pt did admit to relief after noble placement by OB

## 2025-04-29 NOTE — CONSULT NOTE ADULT - ASSESSMENT
< from: CT Abdomen and Pelvis No Cont (25 @ 22:52) >    ACC: [id number] EXAM:  CT ABDOMEN AND PELVIS   ORDERED BY: TUSHAR BRADSHAW     PROCEDURE DATE:  2025          INTERPRETATION:  CLINICAL INFORMATION: Abdominal pain and urinary   retention. Status post  on 4/10/2025.    COMPARISON: None.    CONTRAST/COMPLICATIONS:  IV Contrast: NONE  25 cc administered   25 cc discarded  Oral Contrast: Omnipaque 350  .    PROCEDURE:  CT of the abdomen and Pelvis was performed (CT Cystogram).  Precontrast images were obtained through the abdomen and pelvis followed   by imaging after the instillation of a dilute mixture of [id number] via   a Noble catheter.  Sagittal and coronal reformats were performed.    FINDINGS:  LOWER CHEST: Within normal limits.    LIVER: Within normal limits.  BILE DUCTS: Normal caliber.  GALLBLADDER: Within normal limits.  SPLEEN: Within normal limits.  PANCREAS: Within normal limits.  ADRENALS: Within normal limits.  KIDNEYS/URETERS: Within normal limits.    BLADDER: Noble catheter is present in the bladder lumen. The bladder   opacifies with contrast. There is extraluminal extension of contrast from   the bladder dome (8:62) with a large volume of ascites, presumably   urinoma.  REPRODUCTIVE ORGANS: The uterus and adnexa are within normal limits.    BOWEL: No bowel obstruction.  PERITONEUM: Large volume ascites, presumably urine. Small volume   pneumoperitoneum.  VESSELS:  Within normal limits.  RETROPERITONEUM: No lymphadenopathy.  ABDOMINAL WALL: Small volume of urine and contrast is seen tracking into   thelower ventral abdominal wall (8:61 and 7:123).  BONES: T10-L2 posterior spinal fusion.    IMPRESSION:  Injury to the bladder dome with a large volume urinoma.      Findings were discussed with Dr. TUSHAR BRADSHAW [phone] 2025 11:29 PM   by Dr. Augustine with read back confirmation.    --- End of Report ---        < end of copied text >    Assessment: 29 yo female POD#19 s/p  admitted with urinary retention found to have a bladder dome injury an large urinoma. pt is aseptic, noble draining yellow urine, CR ELEVATED TO 2.95    Plan:  - Keep noble- DONOT TOV   - monitor cr  - urology to follow

## 2025-04-30 ENCOUNTER — TRANSCRIPTION ENCOUNTER (OUTPATIENT)
Age: 30
End: 2025-04-30

## 2025-04-30 VITALS
RESPIRATION RATE: 18 BRPM | HEART RATE: 68 BPM | TEMPERATURE: 99 F | SYSTOLIC BLOOD PRESSURE: 104 MMHG | DIASTOLIC BLOOD PRESSURE: 68 MMHG | OXYGEN SATURATION: 100 %

## 2025-04-30 LAB
ANION GAP SERPL CALC-SCNC: 13 MMOL/L — SIGNIFICANT CHANGE UP (ref 5–17)
APTT BLD: 31.7 SEC — SIGNIFICANT CHANGE UP (ref 26.1–36.8)
BUN SERPL-MCNC: 11.2 MG/DL — SIGNIFICANT CHANGE UP (ref 8–20)
CALCIUM SERPL-MCNC: 7.6 MG/DL — LOW (ref 8.4–10.5)
CHLORIDE SERPL-SCNC: 106 MMOL/L — SIGNIFICANT CHANGE UP (ref 96–108)
CO2 SERPL-SCNC: 18 MMOL/L — LOW (ref 22–29)
CREAT SERPL-MCNC: 0.52 MG/DL — SIGNIFICANT CHANGE UP (ref 0.5–1.3)
EGFR: 127 ML/MIN/1.73M2 — SIGNIFICANT CHANGE UP
EGFR: 127 ML/MIN/1.73M2 — SIGNIFICANT CHANGE UP
GLUCOSE SERPL-MCNC: 85 MG/DL — SIGNIFICANT CHANGE UP (ref 70–99)
HCT VFR BLD CALC: 28.4 % — LOW (ref 34.5–45)
HGB BLD-MCNC: 9 G/DL — LOW (ref 11.5–15.5)
INR BLD: 1.19 RATIO — HIGH (ref 0.85–1.16)
MCHC RBC-ENTMCNC: 24.9 PG — LOW (ref 27–34)
MCHC RBC-ENTMCNC: 31.7 G/DL — LOW (ref 32–36)
MCV RBC AUTO: 78.7 FL — LOW (ref 80–100)
NRBC # BLD AUTO: 0 K/UL — SIGNIFICANT CHANGE UP (ref 0–0)
NRBC # FLD: 0 K/UL — SIGNIFICANT CHANGE UP (ref 0–0)
NRBC BLD AUTO-RTO: 0 /100 WBCS — SIGNIFICANT CHANGE UP (ref 0–0)
PLATELET # BLD AUTO: 731 K/UL — HIGH (ref 150–400)
PMV BLD: 8.9 FL — SIGNIFICANT CHANGE UP (ref 7–13)
POTASSIUM SERPL-MCNC: 3.5 MMOL/L — SIGNIFICANT CHANGE UP (ref 3.5–5.3)
POTASSIUM SERPL-SCNC: 3.5 MMOL/L — SIGNIFICANT CHANGE UP (ref 3.5–5.3)
PROTHROM AB SERPL-ACNC: 13.4 SEC — SIGNIFICANT CHANGE UP (ref 9.9–13.4)
RBC # BLD: 3.61 M/UL — LOW (ref 3.8–5.2)
RBC # FLD: 18.9 % — HIGH (ref 10.3–14.5)
SODIUM SERPL-SCNC: 137 MMOL/L — SIGNIFICANT CHANGE UP (ref 135–145)
WBC # BLD: 7.18 K/UL — SIGNIFICANT CHANGE UP (ref 3.8–10.5)
WBC # FLD AUTO: 7.18 K/UL — SIGNIFICANT CHANGE UP (ref 3.8–10.5)

## 2025-04-30 PROCEDURE — 85027 COMPLETE CBC AUTOMATED: CPT

## 2025-04-30 PROCEDURE — 85730 THROMBOPLASTIN TIME PARTIAL: CPT

## 2025-04-30 PROCEDURE — 83615 LACTATE (LD) (LDH) ENZYME: CPT

## 2025-04-30 PROCEDURE — 74176 CT ABD & PELVIS W/O CONTRAST: CPT | Mod: MC

## 2025-04-30 PROCEDURE — 87086 URINE CULTURE/COLONY COUNT: CPT

## 2025-04-30 PROCEDURE — 81001 URINALYSIS AUTO W/SCOPE: CPT

## 2025-04-30 PROCEDURE — 87070 CULTURE OTHR SPECIMN AEROBIC: CPT

## 2025-04-30 PROCEDURE — 85025 COMPLETE CBC W/AUTO DIFF WBC: CPT

## 2025-04-30 PROCEDURE — 87015 SPECIMEN INFECT AGNT CONCNTJ: CPT

## 2025-04-30 PROCEDURE — C1729: CPT

## 2025-04-30 PROCEDURE — 87205 SMEAR GRAM STAIN: CPT

## 2025-04-30 PROCEDURE — 36415 COLL VENOUS BLD VENIPUNCTURE: CPT

## 2025-04-30 PROCEDURE — 84157 ASSAY OF PROTEIN OTHER: CPT

## 2025-04-30 PROCEDURE — 88112 CYTOPATH CELL ENHANCE TECH: CPT

## 2025-04-30 PROCEDURE — 86850 RBC ANTIBODY SCREEN: CPT

## 2025-04-30 PROCEDURE — 85610 PROTHROMBIN TIME: CPT

## 2025-04-30 PROCEDURE — 87075 CULTR BACTERIA EXCEPT BLOOD: CPT

## 2025-04-30 PROCEDURE — 86901 BLOOD TYPING SEROLOGIC RH(D): CPT

## 2025-04-30 PROCEDURE — 86900 BLOOD TYPING SEROLOGIC ABO: CPT

## 2025-04-30 PROCEDURE — 88305 TISSUE EXAM BY PATHOLOGIST: CPT

## 2025-04-30 PROCEDURE — 87102 FUNGUS ISOLATION CULTURE: CPT

## 2025-04-30 PROCEDURE — 89051 BODY FLUID CELL COUNT: CPT

## 2025-04-30 PROCEDURE — 82042 OTHER SOURCE ALBUMIN QUAN EA: CPT

## 2025-04-30 PROCEDURE — 80048 BASIC METABOLIC PNL TOTAL CA: CPT

## 2025-04-30 RX ORDER — CEPHALEXIN 250 MG/1
1 CAPSULE ORAL
Qty: 15 | Refills: 0
Start: 2025-04-30 | End: 2025-05-04

## 2025-04-30 RX ADMIN — Medication 975 MILLIGRAM(S): at 08:59

## 2025-04-30 RX ADMIN — Medication 975 MILLIGRAM(S): at 00:01

## 2025-04-30 NOTE — DISCHARGE NOTE PROVIDER - PROVIDER TOKENS
PROVIDER:[TOKEN:[[id number]]],PROVIDER:[TOKEN:[[id number]]] PROVIDER:[TOKEN:[[id number]]],PROVIDER:[TOKEN:[[id number]],FOLLOWUP:[1 week]] PROVIDER:[TOKEN:[[id number]],FOLLOWUP:[1 week]],PROVIDER:[TOKEN:[[id number]],FOLLOWUP:[1 week]]

## 2025-04-30 NOTE — DISCHARGE NOTE PROVIDER - NSDCMRMEDTOKEN_GEN_ALL_CORE_FT
ibuprofen 600 mg oral tablet: 1 tab(s) orally every 6 hours  Tylenol 325 mg oral tablet: 3 tab(s) orally every 6 hours   cephalexin 500 mg oral tablet: 1 tab(s) orally 3 times a day  ibuprofen 600 mg oral tablet: 1 tab(s) orally every 6 hours  Tylenol 325 mg oral tablet: 3 tab(s) orally every 6 hours

## 2025-04-30 NOTE — DISCHARGE NOTE PROVIDER - NSDCQMAMI_CARD_ALL_CORE
Bleeding that does not stop/Pain not relieved by Medications/Fever greater than (need to indicate Fahrenheit or Celsius)/Unable to urinate No

## 2025-04-30 NOTE — DISCHARGE NOTE NURSING/CASE MANAGEMENT/SOCIAL WORK - NSDCVIVACCINE_GEN_ALL_CORE_FT
MMR; 11-Apr-2025 17:14; Alberta Cho (RN); [employer] [id number] (Exp. Date: 30-Jul-2026); SubCutaneous; Arm Left; 0.5 milliLiter(s); VIS (VIS Published: 20-Apr-2012, VIS Presented: 11-Apr-2025);

## 2025-04-30 NOTE — DISCHARGE NOTE NURSING/CASE MANAGEMENT/SOCIAL WORK - PATIENT PORTAL LINK FT
You can access the FollowMyHealth Patient Portal offered by [facility] by registering at the following website: [email or web address]. By joining [employer]’s FollowMyHealth portal, you will also be able to view your health information using other applications (apps) compatible with our system.

## 2025-04-30 NOTE — PROGRESS NOTE ADULT - ASSESSMENT
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 Continue current pain regimen.  CV: No history of cardiovascular disease. Blood pressure well controlled.  Pulm: No active disease. Saturating well on room air. Incentive spirometer use encouraged  GI: No active disease. Bowel sounds and function normal, tolerating PO diet. Continue current bowel regimen.   : Cystotomy with urinoma, s/p IR paracentesis of abdominal fluid, noble in place for drainage. AMANDA on admission Cr 2.5>1.35>0.55, resolved. Urology consulted, recs appreciated  Heme: Hgb 10>9-> AM labs pending  ID: Afebrile. No antibiotics indicated at this time.   FEN: Electrolytes WNL. AM labs pending.   DVT ppx: Ambulation encouraged, SCDs when in bed, lovenox ordered.  Dispo: Pending labs and AM rounds. Anticipate discharge today pending attending approval    
31 yo female s/p recent , bladder dome injury, noble catheter placement, IR drainage of urinoma  - keep noble catheter in place- do not remove  - may d/c pt home with noble to leg bag, please provide large bag for nighttime use  - abx  - pt will need to keep noble catheter in place additional 10 days  - pt to f/u with Dr. Encarnacion in office, will need CT cystogram study prior to any consideration of removing noble catheter  - pt to return to ED if she develops a fever

## 2025-04-30 NOTE — DISCHARGE NOTE PROVIDER - CARE PROVIDERS DIRECT ADDRESSES
[email or web address]

## 2025-04-30 NOTE — DISCHARGE NOTE PROVIDER - NSDCCPCAREPLAN_GEN_ALL_CORE_FT
PRINCIPAL DISCHARGE DIAGNOSIS  Diagnosis: Paravesical urinoma  Assessment and Plan of Treatment:

## 2025-04-30 NOTE — DISCHARGE NOTE PROVIDER - HOSPITAL COURSE
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 Pt had noble catheter placed and underwent paracentesis with 7800mL clear yellow fluid removed. On day of discharge, pain is well controlled with PRN medication. She has no difficulty with ambulation or PO intake. Lab values and vital signs are within normal limits prior to discharge. Patient discharged with noble catheter in place and to follow w/ Urology.

## 2025-04-30 NOTE — DISCHARGE NOTE PROVIDER - NSDCFUSCHEDAPPT_GEN_ALL_CORE_FT
Benedict Feliz  [facility]  [facility] [address]  Scheduled Appointment: 05/01/2025

## 2025-04-30 NOTE — PROGRESS NOTE ADULT - SUBJECTIVE AND OBJECTIVE BOX
Subjective:30yFemale s/p recent , found to have bladder injury and large urinoma.  Pt had paracentesis yesterday in IR, 7.8L urine drained.  pt feeling better today, still has some c/o discomfort, but overall better.  Cuellar catheter in place, draining yellow urine, some sediment.    Cuellar: yellow    Vital Signs Last 24 Hrs  T(C): 37.1 (2025 04:00), Max: 37.4 (2025 19:40)  T(F): 98.7 (2025 04:00), Max: 99.3 (2025 19:40)  HR: 66 (2025 04:00) (62 - 84)  BP: 101/64 (2025 04:00) (97/60 - 126/76)  BP(mean): --  RR: 17 (2025 04:00) (16 - 17)  SpO2: 100% (2025 04:00) (99% - 100%)    Parameters below as of 2025 04:00  Patient On (Oxygen Delivery Method): room air      I&O's Detail    2025 07:01  -  2025 07:00  --------------------------------------------------------  IN:  Total IN: 0 mL    OUT:    Indwelling Catheter - Urethral (mL): 750 mL  Total OUT: 750 mL    Total NET: -750 mL          Labs:                        9.0    7.18  )-----------( 731      ( 2025 04:15 )             28.4     04-30    137  |  106  |  11.2  ----------------------------<  85  3.5   |  18.0[L]  |  0.52    Ca    7.6[L]      2025 04:15      PT/INR - ( 2025 04:15 )   PT: 13.4 sec;   INR: 1.19 ratio         PTT - ( 2025 04:15 )  PTT:31.7 sec      Culture - Body Fluid with Gram Stain (collected 2025 09:30)  Source: Peritoneal Peritoneal Fluid  Gram Stain (2025 21:53):    polymorphonuclear leukocytes seen    No organisms seen    by cytocentrifuge    Culture - Fungal, Body Fluid (collected 2025 09:30)  Source: Peritoneal Peritoneal Fluid  Preliminary Report (2025 08:01):    Testing in progress    Culture - Urine (collected 2025 21:00)  Source: Catheterized Catheterized  Final Report (2025 22:20):    No growth

## 2025-04-30 NOTE — DISCHARGE NOTE NURSING/CASE MANAGEMENT/SOCIAL WORK - FINANCIAL ASSISTANCE
[facility] provides services at a reduced cost to those who are determined to be eligible through [facility]’s financial assistance program. Information regarding [facility]’s financial assistance program can be found by going to [email or web address] or by calling [phone].

## 2025-04-30 NOTE — DISCHARGE NOTE PROVIDER - CARE PROVIDER_API CALL
Benedict Feliz  Obstetrics and Gynecology  [address] [place] [address]  Phone: [phone]  Fax: [phone]  Follow Up Time:     Maldonado Encarnacion  Urology  [address] [place] [address]  Phone: [phone]  Fax: [phone]  Follow Up Time:    Benedict Feliz  Obstetrics and Gynecology  [address] [place] [address]  Phone: [phone]  Fax: [phone]  Follow Up Time:     Maldonado Encarnacion  Urology  [address] [place] [address]  Phone: [phone]  Fax: [phone]  Follow Up Time: 1 week   Benedict Feliz  Obstetrics and Gynecology  [address] [place] [address]  Phone: [phone]  Fax: [phone]  Follow Up Time: 1 week    Maldonado Encarnacion  Urology  [address] [place] [address]  Phone: [phone]  Fax: [phone]  Follow Up Time: 1 week

## 2025-04-30 NOTE — PROGRESS NOTE ADULT - SUBJECTIVE AND OBJECTIVE BOX
JOSE LUIS PENN is a 30y now HD#2 s/p rCS BS 4/10 who presents with LLQ pain, sensation of incomplete emptying, dysuria, LUQ pain - found to have bladder dome defect with large urinoma on CT cystogram s/p paracentesis with IR 4/29 POD#1    S:    No acute events overnight.   Patient was seen and examined at bedside.   Patient has no complaints this AM.   Pain is well controlled with current treatment regimen.   Tolerating regular diet, denies N/V.   Ambulating without difficulty.   + flatus/-BM/ noble in place  She denies lightheadedness, dizziness, palpitations, chest pain and SOB.     O:   T(C): 37.1 (04-30-25 @ 04:00), Max: 37.4 (04-29-25 @ 19:40)  HR: 66 (04-30-25 @ 04:00) (62 - 84)  BP: 101/64 (04-30-25 @ 04:00) (97/60 - 126/76)  RR: 17 (04-30-25 @ 04:00) (16 - 18)  SpO2: 100% (04-30-25 @ 04:00) (99% - 100%)    Gen: NAD, AAOx3  CV: RRR, S1 S2 present  Pulm: CTAB  Abdomen: Soft, nondistended, appropriately tender, + BS   Incision: Clean, dry and intact  Extremities: No calf tenderness or edema     Labs:       04-28-25 @ 07:01  -  04-29-25 @ 07:00  --------------------------------------------------------  IN: 325 mL / OUT: 625 mL / NET: -300 mL    04-29-25 @ 07:01  -  04-30-25 @ 06:31  --------------------------------------------------------  IN: 0 mL / OUT: 750 mL / NET: -750 mL

## 2025-05-02 LAB — NON-GYNECOLOGICAL CYTOLOGY STUDY: SIGNIFICANT CHANGE UP

## 2025-05-04 LAB
CULTURE RESULTS: SIGNIFICANT CHANGE UP
SPECIMEN SOURCE: SIGNIFICANT CHANGE UP

## 2025-05-06 ENCOUNTER — APPOINTMENT (OUTPATIENT)
Dept: OBGYN | Facility: CLINIC | Age: 30
End: 2025-05-06
Payer: MEDICAID

## 2025-05-06 VITALS
WEIGHT: 148 LBS | SYSTOLIC BLOOD PRESSURE: 122 MMHG | HEIGHT: 56 IN | DIASTOLIC BLOOD PRESSURE: 70 MMHG | BODY MASS INDEX: 33.29 KG/M2

## 2025-05-06 VITALS
WEIGHT: 148 LBS | SYSTOLIC BLOOD PRESSURE: 122 MMHG | DIASTOLIC BLOOD PRESSURE: 70 MMHG | BODY MASS INDEX: 33.29 KG/M2 | HEIGHT: 56 IN

## 2025-05-06 PROCEDURE — 0503F POSTPARTUM CARE VISIT: CPT

## 2025-05-22 ENCOUNTER — APPOINTMENT (OUTPATIENT)
Age: 30
End: 2025-05-22

## 2025-05-22 VITALS
WEIGHT: 140 LBS | HEART RATE: 62 BPM | SYSTOLIC BLOOD PRESSURE: 114 MMHG | HEIGHT: 56 IN | BODY MASS INDEX: 31.49 KG/M2 | DIASTOLIC BLOOD PRESSURE: 72 MMHG

## 2025-05-22 DIAGNOSIS — S37.20XD UNSPECIFIED INJURY OF BLADDER, SUBSEQUENT ENCOUNTER: ICD-10-CM

## 2025-05-22 PROCEDURE — 99203 OFFICE O/P NEW LOW 30 MIN: CPT

## 2025-05-29 ENCOUNTER — OUTPATIENT (OUTPATIENT)
Dept: OUTPATIENT SERVICES | Facility: HOSPITAL | Age: 30
LOS: 1 days | End: 2025-05-29
Payer: MEDICAID

## 2025-05-29 ENCOUNTER — APPOINTMENT (OUTPATIENT)
Dept: CT IMAGING | Facility: CLINIC | Age: 30
End: 2025-05-29
Payer: MEDICAID

## 2025-05-29 DIAGNOSIS — Z98.891 HISTORY OF UTERINE SCAR FROM PREVIOUS SURGERY: Chronic | ICD-10-CM

## 2025-05-29 DIAGNOSIS — S37.20XD UNSPECIFIED INJURY OF BLADDER, SUBSEQUENT ENCOUNTER: ICD-10-CM

## 2025-05-29 DIAGNOSIS — Z98.890 OTHER SPECIFIED POSTPROCEDURAL STATES: Chronic | ICD-10-CM

## 2025-05-29 PROCEDURE — 72192 CT PELVIS W/O DYE: CPT

## 2025-05-29 PROCEDURE — 72192 CT PELVIS W/O DYE: CPT | Mod: 26

## 2025-06-02 ENCOUNTER — APPOINTMENT (OUTPATIENT)
Dept: OBGYN | Facility: CLINIC | Age: 30
End: 2025-06-02

## 2025-06-02 VITALS
HEIGHT: 56 IN | DIASTOLIC BLOOD PRESSURE: 72 MMHG | WEIGHT: 139.25 LBS | BODY MASS INDEX: 31.32 KG/M2 | SYSTOLIC BLOOD PRESSURE: 102 MMHG

## 2025-06-02 PROCEDURE — 99212 OFFICE O/P EST SF 10 MIN: CPT | Mod: 24

## 2025-06-02 PROCEDURE — 0503F POSTPARTUM CARE VISIT: CPT

## 2025-06-03 LAB
APPEARANCE: CLEAR
BACTERIA: ABNORMAL /HPF
BILIRUBIN URINE: NEGATIVE
BLOOD URINE: ABNORMAL
CAST: 0 /LPF
COLOR: YELLOW
EPITHELIAL CELLS: 1 /HPF
GLUCOSE QUALITATIVE U: NEGATIVE MG/DL
KETONES URINE: NEGATIVE MG/DL
LEUKOCYTE ESTERASE URINE: ABNORMAL
MICROSCOPIC-UA: NORMAL
NITRITE URINE: POSITIVE
PH URINE: 6.5
PROTEIN URINE: NORMAL MG/DL
RED BLOOD CELLS URINE: 1 /HPF
REVIEW: NORMAL
SPECIFIC GRAVITY URINE: 1.01
UROBILINOGEN URINE: 0.2 MG/DL
WHITE BLOOD CELLS URINE: 11 /HPF

## 2025-06-04 ENCOUNTER — APPOINTMENT (OUTPATIENT)
Dept: UROLOGY | Facility: CLINIC | Age: 30
End: 2025-06-04
Payer: MEDICAID

## 2025-06-04 ENCOUNTER — NON-APPOINTMENT (OUTPATIENT)
Age: 30
End: 2025-06-04

## 2025-06-04 VITALS
DIASTOLIC BLOOD PRESSURE: 68 MMHG | SYSTOLIC BLOOD PRESSURE: 115 MMHG | BODY MASS INDEX: 28.22 KG/M2 | HEART RATE: 48 BPM | WEIGHT: 140 LBS | HEIGHT: 59 IN

## 2025-06-04 LAB
BILIRUB UR QL STRIP: NORMAL
CLARITY UR: CLEAR
COLLECTION METHOD: NORMAL
GLUCOSE UR-MCNC: NORMAL
HCG UR QL: 0.2 EU/DL
HGB UR QL STRIP.AUTO: ABNORMAL
KETONES UR-MCNC: NORMAL
LEUKOCYTE ESTERASE UR QL STRIP: ABNORMAL
NITRITE UR QL STRIP: POSITIVE
PH UR STRIP: 6.5
PROT UR STRIP-MCNC: ABNORMAL
SP GR UR STRIP: 1.01

## 2025-06-04 PROCEDURE — 99211 OFF/OP EST MAY X REQ PHY/QHP: CPT

## 2025-06-04 PROCEDURE — 81003 URINALYSIS AUTO W/O SCOPE: CPT | Mod: QW

## 2025-06-05 DIAGNOSIS — N39.0 URINARY TRACT INFECTION, SITE NOT SPECIFIED: ICD-10-CM

## 2025-06-05 RX ORDER — NITROFURANTOIN (MONOHYDRATE/MACROCRYSTALS) 25; 75 MG/1; MG/1
100 CAPSULE ORAL
Qty: 14 | Refills: 0 | Status: ACTIVE | COMMUNITY
Start: 2025-06-05 | End: 1900-01-01

## 2025-06-06 LAB — BACTERIA UR CULT: ABNORMAL

## 2025-06-18 ENCOUNTER — APPOINTMENT (OUTPATIENT)
Dept: UROLOGY | Facility: CLINIC | Age: 30
End: 2025-06-18

## 2025-06-18 VITALS
DIASTOLIC BLOOD PRESSURE: 79 MMHG | HEART RATE: 59 BPM | SYSTOLIC BLOOD PRESSURE: 119 MMHG | WEIGHT: 140 LBS | HEIGHT: 59 IN | BODY MASS INDEX: 28.22 KG/M2

## 2025-06-18 LAB
BILIRUB UR QL STRIP: NORMAL
CLARITY UR: CLEAR
COLLECTION METHOD: NORMAL
GLUCOSE UR-MCNC: NORMAL
HCG UR QL: 0.2 EU/DL
HGB UR QL STRIP.AUTO: ABNORMAL
KETONES UR-MCNC: NORMAL
LEUKOCYTE ESTERASE UR QL STRIP: ABNORMAL
NITRITE UR QL STRIP: NORMAL
PH UR STRIP: 6
PROT UR STRIP-MCNC: ABNORMAL
SP GR UR STRIP: 1.02

## 2025-06-18 PROCEDURE — 51798 US URINE CAPACITY MEASURE: CPT

## 2025-06-18 PROCEDURE — 81003 URINALYSIS AUTO W/O SCOPE: CPT | Mod: QW

## 2025-06-18 PROCEDURE — 99213 OFFICE O/P EST LOW 20 MIN: CPT | Mod: 25

## 2025-06-30 ENCOUNTER — APPOINTMENT (OUTPATIENT)
Dept: OBGYN | Facility: CLINIC | Age: 30
End: 2025-06-30

## 2025-09-11 ENCOUNTER — APPOINTMENT (OUTPATIENT)
Dept: OBGYN | Facility: CLINIC | Age: 30
End: 2025-09-11